# Patient Record
Sex: FEMALE | Race: WHITE | Employment: UNEMPLOYED | URBAN - METROPOLITAN AREA
[De-identification: names, ages, dates, MRNs, and addresses within clinical notes are randomized per-mention and may not be internally consistent; named-entity substitution may affect disease eponyms.]

---

## 2017-01-23 RX ORDER — CETIRIZINE HYDROCHLORIDE 10 MG/1
TABLET ORAL
Qty: 30 TABLET | Refills: 3 | Status: SHIPPED | OUTPATIENT
Start: 2017-01-23 | End: 2017-05-15 | Stop reason: SDUPTHER

## 2017-02-06 ENCOUNTER — PATIENT MESSAGE (OUTPATIENT)
Dept: INTERNAL MEDICINE | Age: 52
End: 2017-02-06

## 2017-02-20 ENCOUNTER — OFFICE VISIT (OUTPATIENT)
Dept: INTERNAL MEDICINE | Age: 52
End: 2017-02-20

## 2017-02-20 VITALS
OXYGEN SATURATION: 98 % | BODY MASS INDEX: 50.02 KG/M2 | SYSTOLIC BLOOD PRESSURE: 110 MMHG | HEART RATE: 65 BPM | RESPIRATION RATE: 16 BRPM | TEMPERATURE: 98.7 F | DIASTOLIC BLOOD PRESSURE: 80 MMHG | WEIGHT: 293 LBS | HEIGHT: 64 IN

## 2017-02-20 DIAGNOSIS — D50.9 IRON DEFICIENCY ANEMIA, UNSPECIFIED IRON DEFICIENCY ANEMIA TYPE: Primary | ICD-10-CM

## 2017-02-20 DIAGNOSIS — G43.709 CHRONIC MIGRAINE WITHOUT AURA WITHOUT STATUS MIGRAINOSUS, NOT INTRACTABLE: ICD-10-CM

## 2017-02-20 DIAGNOSIS — Z79.899 ENCOUNTER FOR LONG-TERM CURRENT USE OF MEDICATION: ICD-10-CM

## 2017-02-20 DIAGNOSIS — Z12.31 SCREENING MAMMOGRAM, ENCOUNTER FOR: ICD-10-CM

## 2017-02-20 DIAGNOSIS — E66.01 MORBID OBESITY WITH BMI OF 45.0-49.9, ADULT (HCC): ICD-10-CM

## 2017-02-20 DIAGNOSIS — R30.0 DYSURIA: ICD-10-CM

## 2017-02-20 LAB
AMPHETAMINE SCREEN, URINE: ABNORMAL
BARBITURATE SCREEN URINE: ABNORMAL
BENZODIAZEPINE SCREEN, URINE: ABNORMAL
BILIRUBIN, POC: NORMAL
BLOOD URINE, POC: NORMAL
CANNABINOID SCREEN URINE: ABNORMAL
CLARITY, POC: CLEAR
COCAINE METABOLITE SCREEN URINE: ABNORMAL
COLOR, POC: NORMAL
GLUCOSE URINE, POC: NORMAL
KETONES, POC: NORMAL
LEUKOCYTE EST, POC: NORMAL
Lab: ABNORMAL
NITRITE, POC: NORMAL
OPIATE SCREEN URINE: POSITIVE
PH, POC: 6
PHENCYCLIDINE SCREEN URINE: ABNORMAL
PROTEIN, POC: NORMAL
SPECIFIC GRAVITY, POC: 1.01
UROBILINOGEN, POC: NORMAL

## 2017-02-20 PROCEDURE — 99214 OFFICE O/P EST MOD 30 MIN: CPT | Performed by: FAMILY MEDICINE

## 2017-02-20 PROCEDURE — 81002 URINALYSIS NONAUTO W/O SCOPE: CPT | Performed by: FAMILY MEDICINE

## 2017-02-20 RX ORDER — SUMATRIPTAN 50 MG/1
TABLET, FILM COATED ORAL
Qty: 9 TABLET | Refills: 3 | Status: SHIPPED | OUTPATIENT
Start: 2017-02-20 | End: 2017-08-17 | Stop reason: SDUPTHER

## 2017-02-20 RX ORDER — ACETAMINOPHEN AND CODEINE PHOSPHATE 300; 30 MG/1; MG/1
1-2 TABLET ORAL EVERY 6 HOURS PRN
Qty: 45 TABLET | Refills: 1 | Status: SHIPPED | OUTPATIENT
Start: 2017-02-20 | End: 2017-04-19 | Stop reason: SDUPTHER

## 2017-02-21 ENCOUNTER — PATIENT MESSAGE (OUTPATIENT)
Dept: INTERNAL MEDICINE | Age: 52
End: 2017-02-21

## 2017-03-20 ENCOUNTER — HOSPITAL ENCOUNTER (OUTPATIENT)
Dept: WOMENS IMAGING | Age: 52
Discharge: HOME | End: 2017-03-20
Payer: COMMERCIAL

## 2017-03-20 DIAGNOSIS — Z12.31 SCREENING MAMMOGRAM, ENCOUNTER FOR: ICD-10-CM

## 2017-03-20 PROCEDURE — 77063 BREAST TOMOSYNTHESIS BI: CPT

## 2017-03-22 ENCOUNTER — TELEPHONE (OUTPATIENT)
Dept: INTERNAL MEDICINE | Age: 52
End: 2017-03-22

## 2017-03-22 DIAGNOSIS — R92.8 ABNORMAL MAMMOGRAM: Primary | ICD-10-CM

## 2017-04-04 ENCOUNTER — NURSE ONLY (OUTPATIENT)
Dept: INTERNAL MEDICINE | Age: 52
End: 2017-04-04

## 2017-04-04 DIAGNOSIS — D50.9 IRON DEFICIENCY ANEMIA, UNSPECIFIED IRON DEFICIENCY ANEMIA TYPE: Primary | ICD-10-CM

## 2017-04-04 LAB
BASOPHILS ABSOLUTE: 0.1 K/UL (ref 0–0.2)
BASOPHILS RELATIVE PERCENT: 1.2 %
EOSINOPHILS ABSOLUTE: 0.1 K/UL (ref 0–0.7)
EOSINOPHILS RELATIVE PERCENT: 2.4 %
HCT VFR BLD CALC: 41.5 % (ref 37–47)
HEMOGLOBIN: 13.8 G/DL (ref 12–16)
LYMPHOCYTES ABSOLUTE: 1 K/UL (ref 1–4.8)
LYMPHOCYTES RELATIVE PERCENT: 21.8 %
MCH RBC QN AUTO: 32.3 PG (ref 27–31.3)
MCHC RBC AUTO-ENTMCNC: 33.3 % (ref 33–37)
MCV RBC AUTO: 97.1 FL (ref 82–100)
MONOCYTES ABSOLUTE: 0.4 K/UL (ref 0.2–0.8)
MONOCYTES RELATIVE PERCENT: 7.6 %
NEUTROPHILS ABSOLUTE: 3.2 K/UL (ref 1.4–6.5)
NEUTROPHILS RELATIVE PERCENT: 67 %
PDW BLD-RTO: 13.9 % (ref 11.5–14.5)
PLATELET # BLD: 249 K/UL (ref 130–400)
RBC # BLD: 4.28 M/UL (ref 4.2–5.4)
WBC # BLD: 4.8 K/UL (ref 4.8–10.8)

## 2017-04-05 RX ORDER — FERROUS SULFATE 325(65) MG
TABLET ORAL
Qty: 90 TABLET | Refills: 1
Start: 2017-04-05

## 2017-04-17 ENCOUNTER — HOSPITAL ENCOUNTER (OUTPATIENT)
Dept: WOMENS IMAGING | Age: 52
Discharge: HOME | End: 2017-04-17
Payer: COMMERCIAL

## 2017-04-17 ENCOUNTER — HOSPITAL ENCOUNTER (OUTPATIENT)
Dept: ULTRASOUND IMAGING | Age: 52
Discharge: HOME | End: 2017-04-17
Payer: COMMERCIAL

## 2017-04-17 DIAGNOSIS — R92.8 ABNORMAL MAMMOGRAM OF LEFT BREAST: ICD-10-CM

## 2017-04-17 DIAGNOSIS — R92.8 ABNORMAL MAMMOGRAM: ICD-10-CM

## 2017-04-17 PROCEDURE — G0279 TOMOSYNTHESIS, MAMMO: HCPCS

## 2017-04-17 PROCEDURE — 76642 ULTRASOUND BREAST LIMITED: CPT

## 2017-04-19 RX ORDER — ACETAMINOPHEN AND CODEINE PHOSPHATE 300; 30 MG/1; MG/1
1-2 TABLET ORAL EVERY 6 HOURS PRN
Qty: 45 TABLET | Refills: 1 | Status: SHIPPED | OUTPATIENT
Start: 2017-04-19 | End: 2017-05-15 | Stop reason: SDUPTHER

## 2017-04-19 RX ORDER — MELOXICAM 15 MG/1
TABLET ORAL
Qty: 30 TABLET | Refills: 3 | Status: SHIPPED | OUTPATIENT
Start: 2017-04-19 | End: 2017-08-26 | Stop reason: SDUPTHER

## 2017-05-09 RX ORDER — ACYCLOVIR 400 MG/1
TABLET ORAL
Qty: 30 TABLET | Refills: 2 | Status: SHIPPED | OUTPATIENT
Start: 2017-05-09 | End: 2019-04-16 | Stop reason: ALTCHOICE

## 2017-05-15 ENCOUNTER — OFFICE VISIT (OUTPATIENT)
Dept: INTERNAL MEDICINE | Age: 52
End: 2017-05-15

## 2017-05-15 VITALS
HEIGHT: 64 IN | DIASTOLIC BLOOD PRESSURE: 70 MMHG | HEART RATE: 80 BPM | BODY MASS INDEX: 50.02 KG/M2 | TEMPERATURE: 98 F | WEIGHT: 293 LBS | OXYGEN SATURATION: 98 % | SYSTOLIC BLOOD PRESSURE: 118 MMHG | RESPIRATION RATE: 16 BRPM

## 2017-05-15 DIAGNOSIS — Z01.419 ENCOUNTER FOR GYNECOLOGICAL EXAMINATION: Primary | ICD-10-CM

## 2017-05-15 PROCEDURE — 99396 PREV VISIT EST AGE 40-64: CPT | Performed by: FAMILY MEDICINE

## 2017-05-15 RX ORDER — CETIRIZINE HYDROCHLORIDE 10 MG/1
TABLET ORAL
Qty: 30 TABLET | Refills: 5 | Status: SHIPPED | OUTPATIENT
Start: 2017-05-15 | End: 2017-11-27 | Stop reason: SDUPTHER

## 2017-05-15 RX ORDER — ACETAMINOPHEN AND CODEINE PHOSPHATE 300; 30 MG/1; MG/1
1-2 TABLET ORAL EVERY 6 HOURS PRN
Qty: 45 TABLET | Refills: 1 | Status: SHIPPED | OUTPATIENT
Start: 2017-05-15 | End: 2017-06-21 | Stop reason: SDUPTHER

## 2017-05-19 DIAGNOSIS — Z01.419 ENCOUNTER FOR GYNECOLOGICAL EXAMINATION: ICD-10-CM

## 2017-06-21 RX ORDER — ACETAMINOPHEN AND CODEINE PHOSPHATE 300; 30 MG/1; MG/1
1 TABLET ORAL EVERY 6 HOURS PRN
Qty: 45 TABLET | Refills: 1 | Status: SHIPPED | OUTPATIENT
Start: 2017-06-21 | End: 2017-08-14 | Stop reason: SDUPTHER

## 2017-08-02 ENCOUNTER — HOSPITAL ENCOUNTER (OUTPATIENT)
Dept: GENERAL RADIOLOGY | Age: 52
Discharge: HOME | End: 2017-08-02
Payer: COMMERCIAL

## 2017-08-02 DIAGNOSIS — M47.816 SPONDYLOSIS OF LUMBAR REGION WITHOUT MYELOPATHY OR RADICULOPATHY: ICD-10-CM

## 2017-08-02 PROCEDURE — 72110 X-RAY EXAM L-2 SPINE 4/>VWS: CPT

## 2017-08-14 ENCOUNTER — INITIAL CONSULT (OUTPATIENT)
Dept: PODIATRY | Facility: CLINIC | Age: 52
End: 2017-08-14

## 2017-08-14 VITALS
HEIGHT: 64 IN | WEIGHT: 293 LBS | DIASTOLIC BLOOD PRESSURE: 82 MMHG | TEMPERATURE: 98.2 F | SYSTOLIC BLOOD PRESSURE: 132 MMHG | BODY MASS INDEX: 50.02 KG/M2

## 2017-08-14 DIAGNOSIS — M72.2 PLANTAR FASCIITIS, BILATERAL: ICD-10-CM

## 2017-08-14 DIAGNOSIS — M79.671 PAIN IN BOTH FEET: Primary | ICD-10-CM

## 2017-08-14 DIAGNOSIS — M79.672 PAIN IN BOTH FEET: Primary | ICD-10-CM

## 2017-08-14 DIAGNOSIS — M65.872 OTHER SYNOVITIS AND TENOSYNOVITIS, LEFT ANKLE AND FOOT: ICD-10-CM

## 2017-08-14 DIAGNOSIS — M65.871 OTHER SYNOVITIS AND TENOSYNOVITIS, RIGHT ANKLE AND FOOT: ICD-10-CM

## 2017-08-14 DIAGNOSIS — M20.5X1 HALLUX LIMITUS OF RIGHT FOOT: ICD-10-CM

## 2017-08-14 DIAGNOSIS — M20.5X2 HALLUX LIMITUS OF LEFT FOOT: ICD-10-CM

## 2017-08-14 DIAGNOSIS — M19.072 PRIMARY OSTEOARTHRITIS OF BOTH FEET: ICD-10-CM

## 2017-08-14 DIAGNOSIS — M19.071 PRIMARY OSTEOARTHRITIS OF BOTH FEET: ICD-10-CM

## 2017-08-14 RX ORDER — ACETAMINOPHEN AND CODEINE PHOSPHATE 300; 30 MG/1; MG/1
1 TABLET ORAL EVERY 6 HOURS PRN
Qty: 45 TABLET | Refills: 1 | Status: SHIPPED | OUTPATIENT
Start: 2017-08-14 | End: 2017-08-14 | Stop reason: CLARIF

## 2017-08-14 ASSESSMENT — ENCOUNTER SYMPTOMS
DIARRHEA: 0
NAUSEA: 0
SHORTNESS OF BREATH: 0
CONSTIPATION: 0
BACK PAIN: 1

## 2017-08-15 ENCOUNTER — TELEPHONE (OUTPATIENT)
Dept: PODIATRY | Facility: CLINIC | Age: 52
End: 2017-08-15

## 2017-08-17 RX ORDER — SUMATRIPTAN 50 MG/1
TABLET, FILM COATED ORAL
Qty: 9 TABLET | Refills: 3 | Status: SHIPPED | OUTPATIENT
Start: 2017-08-17 | End: 2018-02-17 | Stop reason: SDUPTHER

## 2017-08-28 ENCOUNTER — OFFICE VISIT (OUTPATIENT)
Dept: PODIATRY | Facility: CLINIC | Age: 52
End: 2017-08-28

## 2017-08-28 VITALS — TEMPERATURE: 98.8 F | HEIGHT: 64 IN | WEIGHT: 293 LBS | BODY MASS INDEX: 50.02 KG/M2

## 2017-08-28 DIAGNOSIS — M65.872 OTHER SYNOVITIS AND TENOSYNOVITIS, LEFT ANKLE AND FOOT: ICD-10-CM

## 2017-08-28 DIAGNOSIS — M72.2 PLANTAR FASCIITIS: ICD-10-CM

## 2017-08-28 DIAGNOSIS — M19.071 PRIMARY OSTEOARTHRITIS OF BOTH FEET: ICD-10-CM

## 2017-08-28 DIAGNOSIS — M65.871 OTHER SYNOVITIS AND TENOSYNOVITIS, RIGHT ANKLE AND FOOT: ICD-10-CM

## 2017-08-28 DIAGNOSIS — M79.671 PAIN IN BOTH FEET: Primary | ICD-10-CM

## 2017-08-28 DIAGNOSIS — M79.672 PAIN IN BOTH FEET: Primary | ICD-10-CM

## 2017-08-28 DIAGNOSIS — M20.5X1 HALLUX LIMITUS, RIGHT: ICD-10-CM

## 2017-08-28 DIAGNOSIS — M19.072 PRIMARY OSTEOARTHRITIS OF BOTH FEET: ICD-10-CM

## 2017-08-28 DIAGNOSIS — M76.821 POSTERIOR TIBIAL TENDONITIS, RIGHT: ICD-10-CM

## 2017-08-28 DIAGNOSIS — M20.5X2 HALLUX LIMITUS OF LEFT FOOT: ICD-10-CM

## 2017-08-28 RX ORDER — MELOXICAM 15 MG/1
TABLET ORAL
Qty: 30 TABLET | Refills: 3 | Status: SHIPPED | OUTPATIENT
Start: 2017-08-28 | End: 2017-12-27 | Stop reason: SDUPTHER

## 2017-08-28 ASSESSMENT — ENCOUNTER SYMPTOMS
BACK PAIN: 1
NAUSEA: 0
CONSTIPATION: 0
DIARRHEA: 0
SHORTNESS OF BREATH: 0

## 2017-09-24 ENCOUNTER — HOSPITAL ENCOUNTER (OUTPATIENT)
Dept: MRI IMAGING | Age: 52
Discharge: HOME | End: 2017-09-24
Payer: COMMERCIAL

## 2017-09-24 DIAGNOSIS — M47.816 SPONDYLOSIS OF LUMBAR REGION WITHOUT MYELOPATHY OR RADICULOPATHY: ICD-10-CM

## 2017-09-24 PROCEDURE — 72148 MRI LUMBAR SPINE W/O DYE: CPT

## 2017-10-13 ENCOUNTER — OFFICE VISIT (OUTPATIENT)
Dept: PODIATRY | Facility: CLINIC | Age: 52
End: 2017-10-13

## 2017-10-13 VITALS — WEIGHT: 293 LBS | HEIGHT: 64 IN | BODY MASS INDEX: 50.02 KG/M2

## 2017-10-13 DIAGNOSIS — M65.871 OTHER SYNOVITIS AND TENOSYNOVITIS, RIGHT ANKLE AND FOOT: ICD-10-CM

## 2017-10-13 DIAGNOSIS — M79.671 PAIN IN BOTH FEET: Primary | ICD-10-CM

## 2017-10-13 DIAGNOSIS — M72.2 PLANTAR FASCIITIS, BILATERAL: ICD-10-CM

## 2017-10-13 DIAGNOSIS — M79.672 PAIN IN BOTH FEET: Primary | ICD-10-CM

## 2017-10-13 DIAGNOSIS — M76.821 POSTERIOR TIBIAL TENDINITIS OF RIGHT LEG: ICD-10-CM

## 2017-10-13 ASSESSMENT — ENCOUNTER SYMPTOMS
SHORTNESS OF BREATH: 0
BACK PAIN: 1
NAUSEA: 0
CONSTIPATION: 0
DIARRHEA: 0

## 2017-11-10 NOTE — PROGRESS NOTES
<><><> Patient is Requesting this Medication <><><>    <><><> THIS MEDICATION IS NOT ON THE PATIENTS CURRENT MED LIST! PLEASE ADD - AND SEND A NEW RX <><><>        Patients mail order pharmacy is closing and she needs new Rx's for her lancets and test strips. She has a Prodigy AutoCode talking meter and needs Prodigy AutoCode Test Strips NDC [id number] and Prodigy Twist Top Lancets 28G NDC [id number].    This is a Medicare patient so please send Medicare compliant Rx's.    Thanks,  April Madison [facility] Float Tech.  [facility]   Grecia Vernon  (1965)    10/13/17    Subjective:     Grecia Vernon is 54 y.o. female who complains today of:    Chief Complaint   Patient presents with    Follow-up     6 WEEK    Foot Pain     Bilateral       HPI:  Patient presents for follow-up care for right foot pain. The patient states that she is not having pain across the entire front of the ankle joint she walks. This pain is not present when she is at rest.  There is an ache while at rest, the pain is sharp when walking. The patient states that she has been wearing the inserts as instructed. She has been performing the exercises and icing as instructed. Review of Systems   Constitutional: Negative for fever. HENT: Negative for hearing loss. Respiratory: Negative for shortness of breath. Gastrointestinal: Negative for constipation, diarrhea and nausea. Genitourinary: Negative for difficulty urinating. Musculoskeletal: Positive for back pain. Negative for neck pain. Skin: Negative for rash. Neurological: Negative for headaches. Hematological: Does not bruise/bleed easily. Psychiatric/Behavioral: Negative for sleep disturbance. She has not tried physical therapy. Date of last of last PT treatment: none    Allergies:  Seasonal    Current Outpatient Prescriptions on File Prior to Visit   Medication Sig Dispense Refill    acetaminophen-codeine (TYLENOL #3) 300-30 MG per tablet Take 1 tablet by mouth daily as needed for Pain 30 tablet 0    meloxicam (MOBIC) 15 MG tablet TAKE ONE TABLET BY MOUTH EVERY DAY AS NEEDED FOR PAIN 30 tablet 3    SUMAtriptan (IMITREX) 50 MG tablet TAKE 1 TABLET BY MOUTH ONCE AS NEEDED FOR MIGRAINE 9 tablet 3    cetirizine (ZYRTEC) 10 MG tablet TAKE ONE TABLET BY MOUTH EVERY DAY 30 tablet 5    acyclovir (ZOVIRAX) 400 MG tablet TAKE ONE TABLET BY MOUTH THREE TIMES A DAY FOR 3 DAYS FOR COLD SORES AS NEEDED 30 tablet 2    ferrous sulfate 325 (65 FE) MG tablet One PO three times a week. 90 tablet 1    Multiple Vitamins-Minerals (HAIR/SKIN/NAILS PO) Take 2 tablets by mouth daily      Cholecalciferol (VITAMIN D) 2000 UNITS CAPS capsule Take 1,000 Units by mouth daily       Ascorbic Acid (VITAMIN C) 500 MG CAPS Take 1 tablet by mouth daily.  Cyanocobalamin (B-12) 1000 MCG TBCR Take 1 tablet by mouth daily. No current facility-administered medications on file prior to visit. Past Medical History:   Diagnosis Date    B12 deficiency     Bursitis, hip     Bursitis, hip     Chronic back pain     Depression     DJD (degenerative joint disease) of knee     L spine, hips, and knees    H/O blood clots 3/7/13    in lungs-legs where clear    Headache     Herpes simplex     History of depression     Neuropathy (HCC)     Vit B12 deff and DM    Obesity     dispite gastric bypass    Vitamin D deficiency      Past Surgical History:   Procedure Laterality Date     SECTION      GASTRIC BYPASS SURGERY  2001    HERNIA REPAIR      JOINT REPLACEMENT Right 10/29/13    DR Anjali Shen    SMALL INTESTINE SURGERY      TOTAL KNEE ARTHROPLASTY Right 10/29/13     Social History     Social History    Marital status:      Spouse name: N/A    Number of children: N/A    Years of education: N/A     Occupational History    Not on file.      Social History Main Topics    Smoking status: Never Smoker    Smokeless tobacco: Never Used    Alcohol use No    Drug use: No    Sexual activity: No     Other Topics Concern    Not on file     Social History Narrative    No narrative on file     Family History   Problem Relation Age of Onset    Dementia Father     Heart Disease Father     Parkinsonism Father     Hypertension Mother     High Blood Pressure Mother     Thyroid Cancer Sister            Objective:   Vitals:  Ht 5' 4\" (1.626 m)   Wt 300 lb (136.1 kg)   LMP 2016   BMI 51.49 kg/m² Pain Score:   5    Physical Exam    Vascular:   Dorsalis pedis pulse is graded at 2/4 bilateral foot. Posterior tibial pulses graded at 1/4 bilateral foot. There is no edema noted to the bilateral lower extremity. Capillary refill is immediate bilateral hallux. There are no varicosities noted bilaterally. There are no telangiectasia noted bilaterally. Skin temperature gradient is noted to be warm to warm bilaterally. There are no signs of ischemia or skin atrophy noted bilaterally. Hair growth is present bilaterally.     Neurological:   Protective sensation is intact bilaterally. Vibratory sensation is intact bilaterally. Sharp/dull sensation is intact bilaterally. Patellar deep tendon reflex not tested due to TKA bilateral.  Achilles tendon deep tendon reflex is graded at 0/4 bilaterally. The Babinski response is negative bilaterally. No ankle clonus is noted bilaterally. Pain on palpation of the left common peroneal nerve, no radiating pain elicited.  Positive Tinel sign with percussion of the deep peroneal nerve bilateral foot.     Dermatological:  No open lesions noted bilateral foot. Focal, hyperkeratotic lesions noted to the medial hallux bilaterally, the right first MPJ and distal tip of the right third toe. Mild erythema overlying the fourth and fifth PIPJ and third PIPJ bilateral foot. Nails are within normal limits and are well groomed. Normal texture and turgor noted bilaterally. Webspaces are intact bilateral foot.     Musculoskeletal:  Rectus foot type noted nonweightbearing bilaterally. Planus foot type noted with weightbearing. Manual muscle strength is graded at 5/5 for all groups tested bilateral foot. Gross static deformities noted: Tailor's bunion and adductovarus fifth digit bilateral foot.  Semirigid hammertoe deformities noted to toes 2 through 4 bilateral foot. .  Pain and crepitus noted with range of motion of the subtalar joint bilateral foot.  Pain on palpation of the sinus tarsi right foot.  Functional hallux limitus noted bilateral foot.  Pain on

## 2017-11-27 ENCOUNTER — NURSE ONLY (OUTPATIENT)
Dept: INTERNAL MEDICINE | Age: 52
End: 2017-11-27

## 2017-11-27 DIAGNOSIS — Z23 NEED FOR INFLUENZA VACCINATION: Primary | ICD-10-CM

## 2017-11-27 PROCEDURE — 90471 IMMUNIZATION ADMIN: CPT | Performed by: FAMILY MEDICINE

## 2017-11-27 PROCEDURE — 90688 IIV4 VACCINE SPLT 0.5 ML IM: CPT | Performed by: FAMILY MEDICINE

## 2017-11-27 RX ORDER — CETIRIZINE HYDROCHLORIDE 10 MG/1
TABLET ORAL
Qty: 30 TABLET | Refills: 4 | Status: SHIPPED | OUTPATIENT
Start: 2017-11-27 | End: 2018-04-26 | Stop reason: SDUPTHER

## 2017-12-28 PROBLEM — M46.1 SACROILIITIS, NOT ELSEWHERE CLASSIFIED (HCC): Status: ACTIVE | Noted: 2017-12-28

## 2017-12-28 RX ORDER — MELOXICAM 15 MG/1
TABLET ORAL
Qty: 30 TABLET | Refills: 2 | Status: SHIPPED | OUTPATIENT
Start: 2017-12-28 | End: 2018-03-14

## 2018-02-19 RX ORDER — SUMATRIPTAN 50 MG/1
TABLET, FILM COATED ORAL
Qty: 9 TABLET | Refills: 2 | Status: SHIPPED | OUTPATIENT
Start: 2018-02-19 | End: 2018-05-28 | Stop reason: SDUPTHER

## 2018-03-27 ENCOUNTER — OFFICE VISIT (OUTPATIENT)
Dept: INTERNAL MEDICINE CLINIC | Age: 53
End: 2018-03-27
Payer: COMMERCIAL

## 2018-03-27 VITALS
TEMPERATURE: 98.6 F | SYSTOLIC BLOOD PRESSURE: 110 MMHG | HEIGHT: 64 IN | DIASTOLIC BLOOD PRESSURE: 78 MMHG | OXYGEN SATURATION: 97 % | HEART RATE: 75 BPM | RESPIRATION RATE: 16 BRPM | BODY MASS INDEX: 50.02 KG/M2 | WEIGHT: 293 LBS

## 2018-03-27 DIAGNOSIS — J06.9 UPPER RESPIRATORY TRACT INFECTION, UNSPECIFIED TYPE: ICD-10-CM

## 2018-03-27 DIAGNOSIS — E55.9 VITAMIN D DEFICIENCY: ICD-10-CM

## 2018-03-27 DIAGNOSIS — Z98.84 S/P BARIATRIC SURGERY: Primary | ICD-10-CM

## 2018-03-27 DIAGNOSIS — Z13.220 LIPID SCREENING: ICD-10-CM

## 2018-03-27 DIAGNOSIS — E53.8 B12 DEFICIENCY: ICD-10-CM

## 2018-03-27 DIAGNOSIS — M81.8 OTHER OSTEOPOROSIS WITHOUT CURRENT PATHOLOGICAL FRACTURE: ICD-10-CM

## 2018-03-27 DIAGNOSIS — Z12.11 SCREENING FOR COLON CANCER: ICD-10-CM

## 2018-03-27 DIAGNOSIS — Z12.31 SCREENING MAMMOGRAM, ENCOUNTER FOR: ICD-10-CM

## 2018-03-27 DIAGNOSIS — R53.83 FATIGUE, UNSPECIFIED TYPE: ICD-10-CM

## 2018-03-27 PROBLEM — M46.1 SACROILIITIS, NOT ELSEWHERE CLASSIFIED (HCC): Status: RESOLVED | Noted: 2017-12-28 | Resolved: 2018-03-27

## 2018-03-27 PROCEDURE — 99214 OFFICE O/P EST MOD 30 MIN: CPT | Performed by: FAMILY MEDICINE

## 2018-03-27 RX ORDER — MELOXICAM 15 MG/1
15 TABLET ORAL
COMMUNITY
End: 2018-04-23

## 2018-03-27 RX ORDER — ZOLEDRONIC ACID 5 MG/100ML
5 INJECTION, SOLUTION INTRAVENOUS ONCE
Qty: 100 ML | Refills: 0 | Status: SHIPPED | OUTPATIENT
Start: 2018-03-27 | End: 2018-03-29 | Stop reason: SDUPTHER

## 2018-03-27 RX ORDER — AZITHROMYCIN 250 MG/1
TABLET, FILM COATED ORAL
Qty: 1 PACKET | Refills: 0 | Status: SHIPPED | OUTPATIENT
Start: 2018-03-27 | End: 2018-04-06

## 2018-03-27 ASSESSMENT — PATIENT HEALTH QUESTIONNAIRE - PHQ9
SUM OF ALL RESPONSES TO PHQ QUESTIONS 1-9: 0
1. LITTLE INTEREST OR PLEASURE IN DOING THINGS: 0
SUM OF ALL RESPONSES TO PHQ9 QUESTIONS 1 & 2: 0
2. FEELING DOWN, DEPRESSED OR HOPELESS: 0

## 2018-03-27 NOTE — PROGRESS NOTES
for cough, negative for dyspnea and wheezing. Cardiovascular:  Negative for chest pain, claudication and irregular heartbeat/palpitations. Gastrointestinal: Negative for abdominal pain, nausea, constipation and diarrhea. Genitourinary: Negative for dysuria,patient post menopausal.  Metabolic/Endocrine: Negative for cold intolerance, heat intolerance, polydipsia and polyphagia. No unintended weight loss or weight gain. Neuro/Psychiatric: Negative for gait disturbance. Negative for psychiatric symptoms. Dermatologic: Negative for pruritus and rash. Musculoskeletal: positive for bone/joint symptoms. No numbness or tingling. No loss of function. Hematology: Negative for bleeding and easy bruising. Immunology:  Negative for environmental allergies and food allergies. Physical Exam    Patient's medication, allergies, past medical, surgical, social and family histories were reviewed and updated as appropriate. PHYSICAL EXAM   General Appearance:  Alert oriented pleasant cooperative in no acute distress. HEENT: Eyes clear nonicteric facial muscles symmetrical.  No pain to palpation of sinuses. Ears TMs intact no erythema canals normal, oropharynx slightly hyperemic no exudates indurations or ulceration  Neck: Soft nontender but she does have certain cervical adenopathy more prominent on the left than the right slightly tender. No masses no carotid bruits  Lungs: Clear to auscultation no wheezes rhonchi or rales  Heart: Regular rate and rhythm without murmurs rubs or gallops  Extremities: No rashes or edema neurologically intact. Assessment:  1. S/P bariatric surgery     2. Upper respiratory tract infection, unspecified type  azithromycin (ZITHROMAX Z-ANNE-MARIE) 250 MG tablet   3. Screening for colon cancer  [address] [facility] [address] [facility]   4. Screening mammogram, encounter for  EBONY DIGITAL SCREEN W CAD BILATERAL   5. Lipid screening  Lipid Panel   6.  B12 deficiency  Vitamin B12 & Folate   7. BMI 50.0-59.9, adult ([address]     8. Vitamin D deficiency  Vitamin D 25 Hydroxy   9. Fatigue, unspecified type  CBC Auto Differential    TSH with Reflex   10. Other osteoporosis without current pathological fracture  zoledronic acid (RECLAST) 5 MG/100ML SOLN               Plan:  Current Outpatient Prescriptions   Medication Sig Dispense Refill    meloxicam (MOBIC) 15 MG tablet Take 15 mg by mouth      azithromycin (ZITHROMAX Z-ANNE-MARIE) 250 MG tablet Complete entire pack as directed. 1 packet 0    zoledronic acid (RECLAST) 5 MG/100ML SOLN Infuse 100 mLs intravenously once for 1 dose M81.0 100 mL 0    acetaminophen-codeine (TYLENOL #3) 300-30 MG per tablet Take 1 tablet by mouth daily as needed for Pain for up to 30 days. 30 tablet 0    SUMAtriptan (IMITREX) 50 MG tablet TAKE 1 TABLET BY MOUTH ONCE AS NEEDED FOR MIGRAINE 9 tablet 2    cetirizine (ZYRTEC) 10 MG tablet TAKE ONE TABLET BY MOUTH EVERY DAY 30 tablet 4    acyclovir (ZOVIRAX) 400 MG tablet TAKE ONE TABLET BY MOUTH THREE TIMES A DAY FOR 3 DAYS FOR COLD SORES AS NEEDED 30 tablet 2    ferrous sulfate 325 (65 FE) MG tablet One PO three times a week. 90 tablet 1    Multiple Vitamins-Minerals (HAIR/SKIN/NAILS PO) Take 2 tablets by mouth daily      Cholecalciferol (VITAMIN D) 2000 UNITS CAPS capsule Take 1,000 Units by mouth daily       Ascorbic Acid (VITAMIN C) 500 MG CAPS Take 1 tablet by mouth daily.  Cyanocobalamin (B-12) 1000 MCG TBCR Take 1 tablet by mouth daily. No current facility-administered medications for this visit. Orders Placed This Encounter   Procedures    EBONY DIGITAL SCREEN W CAD BILATERAL     Standing Status:   Future     Standing Expiration Date:   3/27/2019     Scheduling Instructions:      US if needed.      Order Specific Question:   Reason for exam:     Answer:   routine    Lipid Panel     Standing Status:   Future     Standing Expiration Date:   3/27/2019     Order Specific Question:   Is Patient Fasting?/# of Hours     Answer:   No    CBC Auto Differential     Standing Status:   Future     Standing Expiration Date:   3/27/2019    Vitamin B12 & Folate     Standing Status:   Future     Standing Expiration Date:   3/27/2019    Vitamin D 25 Hydroxy     Standing Status:   Future     Standing Expiration Date:   3/27/2019    TSH with Reflex     Standing Status:   Future     Standing Expiration Date:   3/27/2019   [id number] [facility], [address] [facility]     Referral Priority:   Routine     Referral Type:   Consult for Advice and Opinion     Referral Reason:   Specialty Services Required     Referred to Provider:   Gisselle Bernal MD     Requested Specialty:   Gastroenterology     Number of Visits Requested:   1       Orders Placed This Encounter   Medications    azithromycin (ZITHROMAX Z-ANNE-MARIE) 250 MG tablet     Sig: Complete entire pack as directed. Dispense:  1 packet     Refill:  0    zoledronic acid (RECLAST) 5 MG/100ML SOLN     Sig: Infuse 100 mLs intravenously once for 1 dose M81.0     Dispense:  100 mL     Refill:  0             Return in about 1 year (around 3/27/2019).     Dr. Santiago Yu      3/27/18  11:39 AM

## 2018-03-29 ENCOUNTER — TELEPHONE (OUTPATIENT)
Dept: INTERNAL MEDICINE CLINIC | Age: 53
End: 2018-03-29

## 2018-03-29 DIAGNOSIS — M81.8 OTHER OSTEOPOROSIS WITHOUT CURRENT PATHOLOGICAL FRACTURE: ICD-10-CM

## 2018-03-29 RX ORDER — ZOLEDRONIC ACID 5 MG/100ML
5 INJECTION, SOLUTION INTRAVENOUS ONCE
Qty: 100 ML | Refills: 0 | Status: SHIPPED | OUTPATIENT
Start: 2018-03-29 | End: 2019-06-20

## 2018-03-29 NOTE — TELEPHONE ENCOUNTER
RX for reclast went to patient mail away pharmacy. Please rewrite and it will need to be faxed to hospital. [facility].

## 2018-04-02 ENCOUNTER — NURSE ONLY (OUTPATIENT)
Dept: INTERNAL MEDICINE CLINIC | Age: 53
End: 2018-04-02
Payer: COMMERCIAL

## 2018-04-02 ENCOUNTER — OFFICE VISIT (OUTPATIENT)
Dept: PODIATRY | Facility: CLINIC | Age: 53
End: 2018-04-02

## 2018-04-02 VITALS — HEIGHT: 64 IN | BODY MASS INDEX: 50.02 KG/M2 | RESPIRATION RATE: 18 BRPM | WEIGHT: 293 LBS

## 2018-04-02 DIAGNOSIS — M65.872 OTHER SYNOVITIS AND TENOSYNOVITIS, LEFT ANKLE AND FOOT: ICD-10-CM

## 2018-04-02 DIAGNOSIS — M79.671 PAIN IN BOTH FEET: Primary | ICD-10-CM

## 2018-04-02 DIAGNOSIS — M79.672 PAIN IN BOTH FEET: Primary | ICD-10-CM

## 2018-04-02 DIAGNOSIS — M65.871 OTHER SYNOVITIS AND TENOSYNOVITIS, RIGHT ANKLE AND FOOT: ICD-10-CM

## 2018-04-02 DIAGNOSIS — E55.9 VITAMIN D DEFICIENCY: ICD-10-CM

## 2018-04-02 DIAGNOSIS — M72.2 BILATERAL PLANTAR FASCIITIS: ICD-10-CM

## 2018-04-02 DIAGNOSIS — R53.83 FATIGUE, UNSPECIFIED TYPE: ICD-10-CM

## 2018-04-02 DIAGNOSIS — E53.8 B12 DEFICIENCY: ICD-10-CM

## 2018-04-02 DIAGNOSIS — Z13.220 LIPID SCREENING: Primary | ICD-10-CM

## 2018-04-02 LAB
BASOPHILS ABSOLUTE: 0 K/UL (ref 0–0.2)
BASOPHILS RELATIVE PERCENT: 0.5 %
CHOLESTEROL, TOTAL: 166 MG/DL (ref 0–199)
EOSINOPHILS ABSOLUTE: 0.1 K/UL (ref 0–0.7)
EOSINOPHILS RELATIVE PERCENT: 2.4 %
FOLATE: 12.5 NG/ML (ref 7.3–26.1)
HCT VFR BLD CALC: 40.4 % (ref 37–47)
HDLC SERPL-MCNC: 51 MG/DL (ref 40–59)
HEMOGLOBIN: 13.5 G/DL (ref 12–16)
LDL CHOLESTEROL CALCULATED: 94 MG/DL (ref 0–129)
LYMPHOCYTES ABSOLUTE: 1.2 K/UL (ref 1–4.8)
LYMPHOCYTES RELATIVE PERCENT: 21.8 %
MCH RBC QN AUTO: 33.4 PG (ref 27–31.3)
MCHC RBC AUTO-ENTMCNC: 33.4 % (ref 33–37)
MCV RBC AUTO: 100 FL (ref 82–100)
MONOCYTES ABSOLUTE: 0.3 K/UL (ref 0.2–0.8)
MONOCYTES RELATIVE PERCENT: 4.7 %
NEUTROPHILS ABSOLUTE: 3.8 K/UL (ref 1.4–6.5)
NEUTROPHILS RELATIVE PERCENT: 70.6 %
PDW BLD-RTO: 12.9 % (ref 11.5–14.5)
PLATELET # BLD: 234 K/UL (ref 130–400)
RBC # BLD: 4.04 M/UL (ref 4.2–5.4)
TRIGL SERPL-MCNC: 106 MG/DL (ref 0–200)
TSH REFLEX: 1.4 UIU/ML (ref 0.27–4.2)
VITAMIN B-12: 808 PG/ML (ref 232–1245)
VITAMIN D 25-HYDROXY: 23.2 NG/ML (ref 30–100)
WBC # BLD: 5.4 K/UL (ref 4.8–10.8)

## 2018-04-02 PROCEDURE — 36415 COLL VENOUS BLD VENIPUNCTURE: CPT | Performed by: FAMILY MEDICINE

## 2018-04-02 ASSESSMENT — ENCOUNTER SYMPTOMS
BACK PAIN: 1
DIARRHEA: 0
CONSTIPATION: 0
NAUSEA: 0
SHORTNESS OF BREATH: 0

## 2018-04-05 ENCOUNTER — HOSPITAL ENCOUNTER (OUTPATIENT)
Dept: WOMENS IMAGING | Age: 53
Discharge: HOME | End: 2018-04-07
Payer: COMMERCIAL

## 2018-04-05 ENCOUNTER — HOSPITAL ENCOUNTER (OUTPATIENT)
Dept: INFUSION THERAPY | Age: 53
Setting detail: INFUSION SERIES
Discharge: HOME | End: 2018-04-05
Payer: COMMERCIAL

## 2018-04-05 VITALS
SYSTOLIC BLOOD PRESSURE: 128 MMHG | HEART RATE: 71 BPM | RESPIRATION RATE: 18 BRPM | DIASTOLIC BLOOD PRESSURE: 82 MMHG | TEMPERATURE: 97.7 F

## 2018-04-05 DIAGNOSIS — Z12.31 SCREENING MAMMOGRAM, ENCOUNTER FOR: ICD-10-CM

## 2018-04-05 PROCEDURE — 77067 SCR MAMMO BI INCL CAD: CPT

## 2018-04-05 PROCEDURE — 96375 TX/PRO/DX INJ NEW DRUG ADDON: CPT

## 2018-04-05 PROCEDURE — 6360000002 HC RX W HCPCS: Performed by: FAMILY MEDICINE

## 2018-04-05 PROCEDURE — 96365 THER/PROPH/DIAG IV INF INIT: CPT

## 2018-04-05 RX ORDER — ZOLEDRONIC ACID 5 MG/100ML
5 INJECTION, SOLUTION INTRAVENOUS ONCE
Status: COMPLETED | OUTPATIENT
Start: 2018-04-05 | End: 2018-04-05

## 2018-04-05 RX ADMIN — ZOLEDRONIC ACID 5 MG: 5 INJECTION INTRAVENOUS at 11:00

## 2018-04-05 NOTE — FLOWSHEET NOTE
Infusion complete. Tolerated well. Ambulated off of the unit. All equipment used in the care for this patient has been cleaned.

## 2018-04-23 ENCOUNTER — OFFICE VISIT (OUTPATIENT)
Dept: PODIATRY | Facility: CLINIC | Age: 53
End: 2018-04-23

## 2018-04-23 VITALS — WEIGHT: 293 LBS | BODY MASS INDEX: 50.02 KG/M2 | HEIGHT: 64 IN | TEMPERATURE: 98.4 F

## 2018-04-23 DIAGNOSIS — M65.871 OTHER SYNOVITIS AND TENOSYNOVITIS, RIGHT ANKLE AND FOOT: ICD-10-CM

## 2018-04-23 DIAGNOSIS — M65.872 OTHER SYNOVITIS AND TENOSYNOVITIS, LEFT ANKLE AND FOOT: ICD-10-CM

## 2018-04-23 DIAGNOSIS — M79.672 PAIN IN BOTH FEET: Primary | ICD-10-CM

## 2018-04-23 DIAGNOSIS — M79.671 PAIN IN BOTH FEET: Primary | ICD-10-CM

## 2018-04-23 DIAGNOSIS — M25.371 INSTABILITY OF ANKLE JOINT, RIGHT: ICD-10-CM

## 2018-04-23 DIAGNOSIS — M25.372 INSTABILITY OF LEFT ANKLE JOINT: ICD-10-CM

## 2018-04-23 DIAGNOSIS — M72.2 PLANTAR FASCIITIS: ICD-10-CM

## 2018-04-23 ASSESSMENT — ENCOUNTER SYMPTOMS
DIARRHEA: 0
NAUSEA: 0
CONSTIPATION: 0
SHORTNESS OF BREATH: 0
BACK PAIN: 1

## 2018-04-24 ENCOUNTER — OFFICE VISIT (OUTPATIENT)
Dept: GASTROENTEROLOGY | Age: 53
End: 2018-04-24

## 2018-04-24 VITALS
SYSTOLIC BLOOD PRESSURE: 130 MMHG | DIASTOLIC BLOOD PRESSURE: 94 MMHG | WEIGHT: 293 LBS | BODY MASS INDEX: 53.04 KG/M2 | HEART RATE: 66 BPM

## 2018-04-24 DIAGNOSIS — Z12.11 SPECIAL SCREENING FOR MALIGNANT NEOPLASMS, COLON: Primary | ICD-10-CM

## 2018-04-24 PROCEDURE — PREOPEXAM PRE-OP EXAM: Performed by: INTERNAL MEDICINE

## 2018-04-27 RX ORDER — CETIRIZINE HYDROCHLORIDE 10 MG/1
TABLET ORAL
Qty: 30 TABLET | Refills: 3 | Status: SHIPPED | OUTPATIENT
Start: 2018-04-27 | End: 2018-08-18 | Stop reason: SDUPTHER

## 2018-05-10 ENCOUNTER — ANESTHESIA EVENT (OUTPATIENT)
Dept: OPERATING ROOM | Age: 53
End: 2018-05-10
Payer: COMMERCIAL

## 2018-05-10 ENCOUNTER — ANESTHESIA (OUTPATIENT)
Dept: OPERATING ROOM | Age: 53
End: 2018-05-10
Payer: COMMERCIAL

## 2018-05-10 ENCOUNTER — HOSPITAL ENCOUNTER (OUTPATIENT)
Age: 53
Setting detail: OUTPATIENT SURGERY
Discharge: HOME | End: 2018-05-10
Attending: INTERNAL MEDICINE | Admitting: INTERNAL MEDICINE
Payer: COMMERCIAL

## 2018-05-10 VITALS
RESPIRATION RATE: 16 BRPM | BODY MASS INDEX: 50.02 KG/M2 | OXYGEN SATURATION: 96 % | DIASTOLIC BLOOD PRESSURE: 99 MMHG | TEMPERATURE: 98.2 F | SYSTOLIC BLOOD PRESSURE: 139 MMHG | HEART RATE: 80 BPM | WEIGHT: 293 LBS | HEIGHT: 64 IN

## 2018-05-10 VITALS
SYSTOLIC BLOOD PRESSURE: 111 MMHG | RESPIRATION RATE: 16 BRPM | OXYGEN SATURATION: 96 % | DIASTOLIC BLOOD PRESSURE: 72 MMHG

## 2018-05-10 PROCEDURE — 2580000003 HC RX 258: Performed by: INTERNAL MEDICINE

## 2018-05-10 PROCEDURE — 3700000000 HC ANESTHESIA ATTENDED CARE: Performed by: INTERNAL MEDICINE

## 2018-05-10 PROCEDURE — G0121 COLON CA SCRN NOT HI RSK IND: HCPCS | Performed by: INTERNAL MEDICINE

## 2018-05-10 PROCEDURE — 3700000001 HC ADD 15 MINUTES (ANESTHESIA): Performed by: INTERNAL MEDICINE

## 2018-05-10 PROCEDURE — 6360000002 HC RX W HCPCS: Performed by: NURSE ANESTHETIST, CERTIFIED REGISTERED

## 2018-05-10 PROCEDURE — 7100000010 HC PHASE II RECOVERY - FIRST 15 MIN: Performed by: INTERNAL MEDICINE

## 2018-05-10 PROCEDURE — 7100000011 HC PHASE II RECOVERY - ADDTL 15 MIN: Performed by: INTERNAL MEDICINE

## 2018-05-10 PROCEDURE — 2500000003 HC RX 250 WO HCPCS: Performed by: ANESTHESIOLOGY

## 2018-05-10 PROCEDURE — 2580000003 HC RX 258: Performed by: NURSE PRACTITIONER

## 2018-05-10 PROCEDURE — 2500000003 HC RX 250 WO HCPCS: Performed by: NURSE ANESTHETIST, CERTIFIED REGISTERED

## 2018-05-10 PROCEDURE — 3609027000 HC COLONOSCOPY: Performed by: INTERNAL MEDICINE

## 2018-05-10 PROCEDURE — 6360000002 HC RX W HCPCS: Performed by: ANESTHESIOLOGY

## 2018-05-10 RX ORDER — METOCLOPRAMIDE HYDROCHLORIDE 5 MG/ML
10 INJECTION INTRAMUSCULAR; INTRAVENOUS
Status: DISCONTINUED | OUTPATIENT
Start: 2018-05-10 | End: 2018-05-10 | Stop reason: HOSPADM

## 2018-05-10 RX ORDER — FENTANYL CITRATE 50 UG/ML
50 INJECTION, SOLUTION INTRAMUSCULAR; INTRAVENOUS EVERY 10 MIN PRN
Status: DISCONTINUED | OUTPATIENT
Start: 2018-05-10 | End: 2018-05-10 | Stop reason: HOSPADM

## 2018-05-10 RX ORDER — SODIUM CHLORIDE 0.9 % (FLUSH) 0.9 %
10 SYRINGE (ML) INJECTION PRN
Status: DISCONTINUED | OUTPATIENT
Start: 2018-05-10 | End: 2018-05-10 | Stop reason: HOSPADM

## 2018-05-10 RX ORDER — MAGNESIUM HYDROXIDE 1200 MG/15ML
LIQUID ORAL PRN
Status: DISCONTINUED | OUTPATIENT
Start: 2018-05-10 | End: 2018-05-10 | Stop reason: HOSPADM

## 2018-05-10 RX ORDER — HYDROCODONE BITARTRATE AND ACETAMINOPHEN 5; 325 MG/1; MG/1
1 TABLET ORAL PRN
Status: DISCONTINUED | OUTPATIENT
Start: 2018-05-10 | End: 2018-05-10 | Stop reason: HOSPADM

## 2018-05-10 RX ORDER — SODIUM CHLORIDE 0.9 % (FLUSH) 0.9 %
10 SYRINGE (ML) INJECTION EVERY 12 HOURS SCHEDULED
Status: DISCONTINUED | OUTPATIENT
Start: 2018-05-10 | End: 2018-05-10 | Stop reason: HOSPADM

## 2018-05-10 RX ORDER — GLYCOPYRROLATE 1 MG/5 ML
SYRINGE (ML) INTRAVENOUS PRN
Status: DISCONTINUED | OUTPATIENT
Start: 2018-05-10 | End: 2018-05-10 | Stop reason: SDUPTHER

## 2018-05-10 RX ORDER — PROPOFOL 10 MG/ML
INJECTION, EMULSION INTRAVENOUS PRN
Status: DISCONTINUED | OUTPATIENT
Start: 2018-05-10 | End: 2018-05-10 | Stop reason: SDUPTHER

## 2018-05-10 RX ORDER — ONDANSETRON 2 MG/ML
4 INJECTION INTRAMUSCULAR; INTRAVENOUS
Status: COMPLETED | OUTPATIENT
Start: 2018-05-10 | End: 2018-05-10

## 2018-05-10 RX ORDER — MEPERIDINE HYDROCHLORIDE 25 MG/ML
12.5 INJECTION INTRAMUSCULAR; INTRAVENOUS; SUBCUTANEOUS EVERY 5 MIN PRN
Status: DISCONTINUED | OUTPATIENT
Start: 2018-05-10 | End: 2018-05-10 | Stop reason: HOSPADM

## 2018-05-10 RX ORDER — LIDOCAINE HYDROCHLORIDE 10 MG/ML
1 INJECTION, SOLUTION EPIDURAL; INFILTRATION; INTRACAUDAL; PERINEURAL
Status: DISCONTINUED | OUTPATIENT
Start: 2018-05-10 | End: 2018-05-10 | Stop reason: HOSPADM

## 2018-05-10 RX ORDER — DIPHENHYDRAMINE HYDROCHLORIDE 50 MG/ML
12.5 INJECTION INTRAMUSCULAR; INTRAVENOUS
Status: DISCONTINUED | OUTPATIENT
Start: 2018-05-10 | End: 2018-05-10 | Stop reason: HOSPADM

## 2018-05-10 RX ORDER — SODIUM CHLORIDE, SODIUM LACTATE, POTASSIUM CHLORIDE, CALCIUM CHLORIDE 600; 310; 30; 20 MG/100ML; MG/100ML; MG/100ML; MG/100ML
INJECTION, SOLUTION INTRAVENOUS CONTINUOUS
Status: DISCONTINUED | OUTPATIENT
Start: 2018-05-10 | End: 2018-05-10 | Stop reason: HOSPADM

## 2018-05-10 RX ORDER — SODIUM CHLORIDE 9 MG/ML
INJECTION, SOLUTION INTRAVENOUS CONTINUOUS
Status: DISCONTINUED | OUTPATIENT
Start: 2018-05-10 | End: 2018-05-10 | Stop reason: HOSPADM

## 2018-05-10 RX ORDER — LIDOCAINE HYDROCHLORIDE 20 MG/ML
INJECTION, SOLUTION INFILTRATION; PERINEURAL PRN
Status: DISCONTINUED | OUTPATIENT
Start: 2018-05-10 | End: 2018-05-10 | Stop reason: SDUPTHER

## 2018-05-10 RX ORDER — LIDOCAINE HYDROCHLORIDE 10 MG/ML
1 INJECTION, SOLUTION EPIDURAL; INFILTRATION; INTRACAUDAL; PERINEURAL
Status: COMPLETED | OUTPATIENT
Start: 2018-05-10 | End: 2018-05-10

## 2018-05-10 RX ORDER — HYDROCODONE BITARTRATE AND ACETAMINOPHEN 5; 325 MG/1; MG/1
2 TABLET ORAL PRN
Status: DISCONTINUED | OUTPATIENT
Start: 2018-05-10 | End: 2018-05-10 | Stop reason: HOSPADM

## 2018-05-10 RX ADMIN — PROPOFOL 50 MG: 10 INJECTION, EMULSION INTRAVENOUS at 12:30

## 2018-05-10 RX ADMIN — PROPOFOL 50 MG: 10 INJECTION, EMULSION INTRAVENOUS at 12:37

## 2018-05-10 RX ADMIN — SODIUM CHLORIDE, POTASSIUM CHLORIDE, SODIUM LACTATE AND CALCIUM CHLORIDE: 600; 310; 30; 20 INJECTION, SOLUTION INTRAVENOUS at 12:12

## 2018-05-10 RX ADMIN — LIDOCAINE HYDROCHLORIDE 50 MG: 20 INJECTION, SOLUTION INFILTRATION; PERINEURAL at 12:28

## 2018-05-10 RX ADMIN — PROPOFOL 20 MG: 10 INJECTION, EMULSION INTRAVENOUS at 12:32

## 2018-05-10 RX ADMIN — LIDOCAINE HYDROCHLORIDE 0.1 ML: 10 INJECTION, SOLUTION EPIDURAL; INFILTRATION; INTRACAUDAL; PERINEURAL at 12:12

## 2018-05-10 RX ADMIN — Medication 0.4 MG: at 12:38

## 2018-05-10 RX ADMIN — PROPOFOL 50 MG: 10 INJECTION, EMULSION INTRAVENOUS at 12:28

## 2018-05-10 RX ADMIN — PROPOFOL 20 MG: 10 INJECTION, EMULSION INTRAVENOUS at 12:41

## 2018-05-10 RX ADMIN — PROPOFOL 30 MG: 10 INJECTION, EMULSION INTRAVENOUS at 12:39

## 2018-05-10 RX ADMIN — PROPOFOL 30 MG: 10 INJECTION, EMULSION INTRAVENOUS at 12:34

## 2018-05-10 RX ADMIN — PROPOFOL 50 MG: 10 INJECTION, EMULSION INTRAVENOUS at 12:29

## 2018-05-10 RX ADMIN — ONDANSETRON 4 MG: 2 INJECTION INTRAMUSCULAR; INTRAVENOUS at 12:13

## 2018-05-10 ASSESSMENT — PULMONARY FUNCTION TESTS
PIF_VALUE: 0
PIF_VALUE: 0
PIF_VALUE: 1
PIF_VALUE: 0

## 2018-05-29 RX ORDER — SUMATRIPTAN 50 MG/1
TABLET, FILM COATED ORAL
Qty: 9 TABLET | Refills: 5 | Status: SHIPPED | OUTPATIENT
Start: 2018-05-29 | End: 2019-04-16 | Stop reason: SDUPTHER

## 2018-07-23 ENCOUNTER — OFFICE VISIT (OUTPATIENT)
Dept: PODIATRY | Facility: CLINIC | Age: 53
End: 2018-07-23

## 2018-07-23 VITALS — BODY MASS INDEX: 50.02 KG/M2 | TEMPERATURE: 97 F | HEIGHT: 64 IN | WEIGHT: 293 LBS

## 2018-07-23 DIAGNOSIS — M72.2 PLANTAR FASCIITIS: ICD-10-CM

## 2018-07-23 DIAGNOSIS — M20.5X1 HALLUX LIMITUS OF RIGHT FOOT: ICD-10-CM

## 2018-07-23 DIAGNOSIS — M79.671 PAIN IN BOTH FEET: Primary | ICD-10-CM

## 2018-07-23 DIAGNOSIS — M65.871 OTHER SYNOVITIS AND TENOSYNOVITIS, RIGHT ANKLE AND FOOT: ICD-10-CM

## 2018-07-23 DIAGNOSIS — M79.672 PAIN IN BOTH FEET: Primary | ICD-10-CM

## 2018-07-23 DIAGNOSIS — M20.5X2 HALLUX LIMITUS OF LEFT FOOT: ICD-10-CM

## 2018-07-23 DIAGNOSIS — M65.872 OTHER SYNOVITIS AND TENOSYNOVITIS, LEFT ANKLE AND FOOT: ICD-10-CM

## 2018-07-23 ASSESSMENT — ENCOUNTER SYMPTOMS
DIARRHEA: 0
NAUSEA: 0
SHORTNESS OF BREATH: 0
CONSTIPATION: 0
BACK PAIN: 1

## 2018-07-23 NOTE — PROGRESS NOTES
Karsten Jose  (1965)    7/23/18    Subjective     Stephanie Jean is 50 y.o. female who complains today of:    Chief Complaint   Patient presents with    Foot Pain       HPI: the patient presents for follow-up. She reports continued bilateral ankle pain. Patient states that she has been wearing her inserts. She states that their are a year old. She describes having sharp pain and points to the lateral aspect of the foot as the most painful area bilaterally. Review of Systems   Constitutional: Negative for fever. HENT: Negative for hearing loss. Respiratory: Negative for shortness of breath. Gastrointestinal: Negative for constipation, diarrhea and nausea. Genitourinary: Negative for difficulty urinating. Musculoskeletal: Positive for back pain. Negative for neck pain. Skin: Negative for rash. Neurological: Negative for headaches. Hematological: Does not bruise/bleed easily. Psychiatric/Behavioral: Negative for sleep disturbance. She has not tried physical therapy. Date of last of last PT treatment: none      Allergies:  Seasonal    Current Outpatient Prescriptions on File Prior to Visit   Medication Sig Dispense Refill    acetaminophen-codeine (TYLENOL #3) 300-30 MG per tablet Take 1 tablet by mouth daily as needed for Pain for up to 30 days. . 30 tablet 0    tiZANidine (ZANAFLEX) 4 MG tablet Take 1 tablet by mouth 2 times daily as needed (spasm) 30 tablet 2    SUMAtriptan (IMITREX) 50 MG tablet take 1 tablet by mouth once as needed for migraine 9 tablet 5    SUMAtriptan (IMITREX) 50 MG tablet TAKE 1 TABLET BY MOUTH ONCE AS NEEDED FOR MIGRAINE 9 tablet 5    cetirizine (ZYRTEC) 10 MG tablet TAKE ONE TABLET BY MOUTH EVERY DAY 30 tablet 3    vitamin D (CHOLECALCIFEROL) 1000 UNIT TABS tablet Take three tabs a day 60 tablet 0    zoledronic acid (RECLAST) 5 MG/100ML SOLN Infuse 100 mLs intravenously once for 1 dose M81.0 100 mL 0    acyclovir (ZOVIRAX) 400 MG tablet 3. Other synovitis and tenosynovitis, left ankle and foot  ID ARTHROCENTESIS ASPIR&/INJ INTERM JT/BURS W/O US   4. Plantar fasciitis     5. Hallux limitus of right foot     6. Hallux limitus of left foot         Plan:     I have instructed the patient to obtain new medical inserts, she is to bring the devices in to be modified with a first ray cutout to accommodate her hallux limitus. I recommended a local anesthetic/corticosteroid injection to the bilateral sinus tarsi prior to her upcoming vacation. The patient will consider this option. Orders Placed This Encounter   Procedures    ID ARTHROCENTESIS ASPIR&/INJ INTERM JT/BURS W/O US     Bilateral sinus tarsi     Standing Status:   Future     Standing Expiration Date:   10/21/2018         Follow up:  Return in about 6 weeks (around 9/3/2018). Rusty Tobar DPM      Please note that this report has been partially produced using speech recognition software which may cause errors including grammar, punctuation, and spelling or words and phrases that may seem inappropriate. If there are questions or concerns please feel free to contact me for clarification.

## 2018-07-30 ENCOUNTER — PROCEDURE VISIT (OUTPATIENT)
Dept: PODIATRY | Facility: CLINIC | Age: 53
End: 2018-07-30

## 2018-07-30 VITALS — HEIGHT: 64 IN | BODY MASS INDEX: 50.02 KG/M2 | WEIGHT: 293 LBS | TEMPERATURE: 98.2 F

## 2018-07-30 DIAGNOSIS — M79.672 PAIN IN BOTH FEET: ICD-10-CM

## 2018-07-30 DIAGNOSIS — M79.671 PAIN IN BOTH FEET: ICD-10-CM

## 2018-07-30 DIAGNOSIS — M65.871 OTHER SYNOVITIS AND TENOSYNOVITIS, RIGHT ANKLE AND FOOT: ICD-10-CM

## 2018-07-30 DIAGNOSIS — M65.872 OTHER SYNOVITIS AND TENOSYNOVITIS, LEFT ANKLE AND FOOT: ICD-10-CM

## 2018-07-30 RX ORDER — ASPIRIN 325 MG
325 TABLET ORAL PRN
COMMUNITY
End: 2019-12-19 | Stop reason: ALTCHOICE

## 2018-07-30 ASSESSMENT — ENCOUNTER SYMPTOMS
BACK PAIN: 1
DIARRHEA: 0
SHORTNESS OF BREATH: 0
CONSTIPATION: 0
NAUSEA: 0

## 2018-07-30 NOTE — PROGRESS NOTES
(65 FE) MG tablet One PO three times a week. 90 tablet 1    Multiple Vitamins-Minerals (HAIR/SKIN/NAILS PO) Take 2 tablets by mouth daily      Ascorbic Acid (VITAMIN C) 500 MG CAPS Take 1 tablet by mouth daily.  Cyanocobalamin (B-12) 1000 MCG TBCR Take 1 tablet by mouth daily. No current facility-administered medications on file prior to visit. Past Medical History:   Diagnosis Date    B12 deficiency     Bursitis, hip     Bursitis, hip     Chronic back pain     Depression     DJD (degenerative joint disease) of knee     L spine, hips, and knees    H/O blood clots 3/7/13    in lungs-legs where clear    Headache(784.0)     Herpes simplex     History of depression     Neuropathy (HCC)     Vit B12 deff and DM    Obesity     dispite gastric bypass    Vitamin D deficiency      Past Surgical History:   Procedure Laterality Date     SECTION      GASTRIC BYPASS SURGERY  2001    HERNIA REPAIR      JOINT REPLACEMENT Bilateral 10/29/13    DR Yeison Mills    RI COLONOSCOPY W/BIOPSY SINGLE/MULTIPLE N/A 5/10/2018    COLONOSCOPY, (12:00) performed by Heidy Arboleda MD at [address] [place] [address] Right 10/29/13     Social History     Social History    Marital status:      Spouse name: N/A    Number of children: N/A    Years of education: N/A     Occupational History    Not on file.      Social History Main Topics    Smoking status: Never Smoker    Smokeless tobacco: Never Used    Alcohol use No    Drug use: No    Sexual activity: No     Other Topics Concern    Not on file     Social History Narrative    No narrative on file     Family History   Problem Relation Age of Onset    Dementia Father     Heart Disease Father     Parkinsonism Father     Hypertension Mother     High Blood Pressure Mother     Thyroid Cancer Sister            Objective:   Vitals:  Temp 98.2 °F (36.8 °C) (Tympanic)   Ht 5' 4\" (1.626 m)   Wt (!) 310 lb (140.6 kg)   [facility] 12/27/2016   BMI 53.21 kg/m² Pain Score:   7    Physical Exam    Vascular:   Dorsalis pedis pulse is graded at 2/4 bilateral foot. Posterior tibial pulses graded at 1/4 bilateral foot. There is nonpitting edema noted to the bilateral lower extremity.     Neurological:   Protective sensation is intact bilaterally. Vibratory sensation is intact bilaterally. Pain on palpation of the left common peroneal nerve, no radiating pain elicited.    Positive Tinel sign with percussion of the deep peroneal nerve bilateral foot.     Dermatological:  No open lesions noted bilateral foot. Focal, hyperkeratotic lesions noted to the medial hallux bilaterally, the right first MPJ and distal tip of the right third toe. Mild erythema overlying the fourth and fifth PIPJ and third PIPJ bilateral foot.     Musculoskeletal:  Rectus foot type noted nonweightbearing bilaterally. Planus foot type noted with weightbearing. Manual muscle strength is graded at 5/5 for all groups tested bilateral foot. Gross static deformities noted: Tailor's bunion and adductovarus fifth digit bilateral foot.  Semirigid hammertoe deformities noted to toes 2-4 bilateral foot. Pain and crepitus noted with range of motion of the subtalar joint bilateral foot.    Pain on palpation of the sinus tarsi bilateral foot.    Functional hallux limitus noted bilateral foot.    Pain on palpation of the medial calcaneal tubercle bilateral foot.    Decreased ankle joint dorsiflexion noted with the knee both extended and flexed bilateral lower extremity.   Pain on palpation of the deltoid ligament of the left ankle. Assessment:      Diagnosis Orders   1. Pain in both feet  IL ARTHROCENTESIS ASPIR&/INJ INTERM JT/BURS W/O US   2. Other synovitis and tenosynovitis, right ankle and foot  IL ARTHROCENTESIS ASPIR&/INJ INTERM JT/BURS W/O US   3.  Other synovitis and tenosynovitis, left ankle and foot  IL ARTHROCENTESIS ASPIR&/INJ INTERM JT/BURS W/O

## 2018-07-30 NOTE — PATIENT INSTRUCTIONS
Patient Education        Joint Injections: Care Instructions  Your Care Instructions    Joint injections are shots into a joint, such as the knee. They may be used to put in medicines, such as pain relievers. A corticosteroid, or steroid, shot is used to reduce inflammation in tendons or joints. It is often used to treat problems such as arthritis, tendinitis, and bursitis. Steroids can be injected directly into a painful, inflamed joint. They can also help reduce inflammation of a bursa. A bursa is a sac of fluid. It cushions and lubricates areas where tendons, ligaments, skin, muscles, or bones rub against each other. A steroid shot can sometimes help with short-term pain relief when other treatments haven't worked. If steroid shots help, pain may improve for weeks or months. Follow-up care is a key part of your treatment and safety. Be sure to make and go to all appointments, and call your doctor if you are having problems. It's also a good idea to know your test results and keep a list of the medicines you take. How can you care for yourself at home? · Put ice or a cold pack on the area for 10 to 20 minutes at a time. Put a thin cloth between the ice and your skin. · Ask your doctor if you can take an over-the-counter pain medicine, such as acetaminophen (Tylenol), ibuprofen (Advil, Motrin), or naproxen (Aleve). Be safe with medicines. Read and follow all instructions on the label. · Avoid strenuous activities for several days. In particular, avoid ones that put stress on the area where you got the shot. · If you have dressings over the area, keep them clean and dry. You may remove them when your doctor tells you to. When should you call for help? Call your doctor now or seek immediate medical care if:    · You have signs of infection, such as:  ¨ Increased pain, swelling, warmth, or redness. ¨ Red streaks leading from the site. ¨ Pus draining from the site.   ¨ A fever.    Watch closely for changes in your health, and be sure to contact your doctor if you have any problems. Where can you learn more? Go to [email or web address] org and sign in to your mSpot account. Enter N616 in the Gourmant box to learn more about \"Joint Injections: Care Instructions. \"     If you do not have an account, please click on the \"Sign Up Now\" link. Current as of: March 24, 2017  Content Version: 11.6  © 3441-7075 [employer]. Care instructions adapted under license by [facility] ([facility]). If you have questions about a medical condition or this instruction, always ask your healthcare professional. [address] any warranty or liability for your use of this information.

## 2018-08-20 RX ORDER — CETIRIZINE HYDROCHLORIDE 10 MG/1
TABLET ORAL
Qty: 30 TABLET | Refills: 2 | Status: SHIPPED | OUTPATIENT
Start: 2018-08-20 | End: 2018-11-14 | Stop reason: SDUPTHER

## 2018-10-17 ENCOUNTER — OFFICE VISIT (OUTPATIENT)
Dept: PODIATRY | Facility: CLINIC | Age: 53
End: 2018-10-17

## 2018-10-17 VITALS — BODY MASS INDEX: 50.02 KG/M2 | HEIGHT: 64 IN | TEMPERATURE: 98 F | WEIGHT: 293 LBS

## 2018-10-17 DIAGNOSIS — M79.672 PAIN IN BOTH FEET: Primary | ICD-10-CM

## 2018-10-17 DIAGNOSIS — G57.92 NEURITIS OF LEFT FOOT: ICD-10-CM

## 2018-10-17 DIAGNOSIS — M19.072 PRIMARY OSTEOARTHRITIS OF BOTH FEET: ICD-10-CM

## 2018-10-17 DIAGNOSIS — M65.872 OTHER SYNOVITIS AND TENOSYNOVITIS, LEFT ANKLE AND FOOT: ICD-10-CM

## 2018-10-17 DIAGNOSIS — M65.871 OTHER SYNOVITIS AND TENOSYNOVITIS, RIGHT ANKLE AND FOOT: ICD-10-CM

## 2018-10-17 DIAGNOSIS — M19.071 PRIMARY OSTEOARTHRITIS OF BOTH FEET: ICD-10-CM

## 2018-10-17 DIAGNOSIS — G57.91 NEURITIS OF RIGHT FOOT: ICD-10-CM

## 2018-10-17 DIAGNOSIS — M79.671 PAIN IN BOTH FEET: Primary | ICD-10-CM

## 2018-10-17 ASSESSMENT — ENCOUNTER SYMPTOMS
CONSTIPATION: 0
BACK PAIN: 1
DIARRHEA: 0
SHORTNESS OF BREATH: 0
NAUSEA: 0

## 2018-10-18 ENCOUNTER — NURSE ONLY (OUTPATIENT)
Dept: INTERNAL MEDICINE CLINIC | Age: 53
End: 2018-10-18
Payer: COMMERCIAL

## 2018-10-18 DIAGNOSIS — Z23 NEED FOR INFLUENZA VACCINATION: Primary | ICD-10-CM

## 2018-10-18 PROCEDURE — 90688 IIV4 VACCINE SPLT 0.5 ML IM: CPT | Performed by: FAMILY MEDICINE

## 2018-10-18 PROCEDURE — 90471 IMMUNIZATION ADMIN: CPT | Performed by: FAMILY MEDICINE

## 2018-10-18 NOTE — PROGRESS NOTES
Vaccine Information Sheet, \"Influenza - Inactivated\"  given to Alfreda La, or parent/legal guardian of  Alfreda La and verbalized understanding. Patient responses:    Have you ever had a reaction to a flu vaccine? No  Are you able to eat eggs without adverse effects? Yes  Do you have any current illness? No  Have you ever had Guillian [place] Syndrome? No    Flu vaccine given per order. Please see immunization tab.

## 2018-10-22 ENCOUNTER — TELEPHONE (OUTPATIENT)
Dept: PODIATRY | Facility: CLINIC | Age: 53
End: 2018-10-22

## 2018-10-29 ENCOUNTER — PROCEDURE VISIT (OUTPATIENT)
Dept: PODIATRY | Facility: CLINIC | Age: 53
End: 2018-10-29

## 2018-10-29 VITALS — BODY MASS INDEX: 50.02 KG/M2 | WEIGHT: 293 LBS | TEMPERATURE: 98 F | HEIGHT: 64 IN

## 2018-10-29 DIAGNOSIS — M65.872 OTHER SYNOVITIS AND TENOSYNOVITIS, LEFT ANKLE AND FOOT: ICD-10-CM

## 2018-10-29 DIAGNOSIS — M79.671 PAIN IN BOTH FEET: ICD-10-CM

## 2018-10-29 DIAGNOSIS — M65.871 OTHER SYNOVITIS AND TENOSYNOVITIS, RIGHT ANKLE AND FOOT: ICD-10-CM

## 2018-10-29 DIAGNOSIS — M79.672 PAIN IN BOTH FEET: ICD-10-CM

## 2018-11-15 RX ORDER — CETIRIZINE HYDROCHLORIDE 10 MG/1
TABLET ORAL
Qty: 30 TABLET | Refills: 5 | Status: SHIPPED | OUTPATIENT
Start: 2018-11-15 | End: 2019-05-18 | Stop reason: SDUPTHER

## 2019-04-16 ENCOUNTER — OFFICE VISIT (OUTPATIENT)
Dept: INTERNAL MEDICINE CLINIC | Age: 54
End: 2019-04-16
Payer: COMMERCIAL

## 2019-04-16 VITALS
OXYGEN SATURATION: 100 % | WEIGHT: 293 LBS | RESPIRATION RATE: 16 BRPM | DIASTOLIC BLOOD PRESSURE: 84 MMHG | HEIGHT: 64 IN | BODY MASS INDEX: 50.02 KG/M2 | SYSTOLIC BLOOD PRESSURE: 118 MMHG | TEMPERATURE: 97.7 F | HEART RATE: 61 BPM

## 2019-04-16 DIAGNOSIS — Z12.31 VISIT FOR SCREENING MAMMOGRAM: ICD-10-CM

## 2019-04-16 DIAGNOSIS — Z13.1 SCREENING FOR DIABETES MELLITUS: ICD-10-CM

## 2019-04-16 DIAGNOSIS — F33.1 MODERATE EPISODE OF RECURRENT MAJOR DEPRESSIVE DISORDER (HCC): Primary | ICD-10-CM

## 2019-04-16 DIAGNOSIS — M81.0 SENILE OSTEOPOROSIS: ICD-10-CM

## 2019-04-16 DIAGNOSIS — R53.83 FATIGUE, UNSPECIFIED TYPE: ICD-10-CM

## 2019-04-16 DIAGNOSIS — Z23 NEED FOR TDAP VACCINATION: ICD-10-CM

## 2019-04-16 LAB
ALBUMIN SERPL-MCNC: 4.3 G/DL (ref 3.5–4.6)
ALP BLD-CCNC: 66 U/L (ref 40–130)
ALT SERPL-CCNC: 15 U/L (ref 0–33)
ANION GAP SERPL CALCULATED.3IONS-SCNC: 16 MEQ/L (ref 9–15)
AST SERPL-CCNC: 23 U/L (ref 0–35)
BILIRUB SERPL-MCNC: 0.3 MG/DL (ref 0.2–0.7)
BUN BLDV-MCNC: 16 MG/DL (ref 6–20)
CALCIUM SERPL-MCNC: 8.7 MG/DL (ref 8.5–9.9)
CHLORIDE BLD-SCNC: 103 MEQ/L (ref 95–107)
CO2: 21 MEQ/L (ref 20–31)
CREAT SERPL-MCNC: 0.59 MG/DL (ref 0.5–0.9)
GFR AFRICAN AMERICAN: >60
GFR NON-AFRICAN AMERICAN: >60
GLOBULIN: 2.4 G/DL (ref 2.3–3.5)
GLUCOSE BLD-MCNC: 75 MG/DL (ref 70–99)
HBA1C MFR BLD: 4.9 % (ref 4.8–5.9)
POTASSIUM SERPL-SCNC: 4.6 MEQ/L (ref 3.4–4.9)
SODIUM BLD-SCNC: 140 MEQ/L (ref 135–144)
TOTAL PROTEIN: 6.7 G/DL (ref 6.3–8)
TSH SERPL DL<=0.05 MIU/L-ACNC: 1.42 UIU/ML (ref 0.44–3.86)

## 2019-04-16 PROCEDURE — 90715 TDAP VACCINE 7 YRS/> IM: CPT | Performed by: FAMILY MEDICINE

## 2019-04-16 PROCEDURE — G0444 DEPRESSION SCREEN ANNUAL: HCPCS | Performed by: FAMILY MEDICINE

## 2019-04-16 PROCEDURE — 90471 IMMUNIZATION ADMIN: CPT | Performed by: FAMILY MEDICINE

## 2019-04-16 PROCEDURE — 99214 OFFICE O/P EST MOD 30 MIN: CPT | Performed by: FAMILY MEDICINE

## 2019-04-16 RX ORDER — DULOXETIN HYDROCHLORIDE 30 MG/1
30 CAPSULE, DELAYED RELEASE ORAL DAILY
Qty: 90 CAPSULE | Refills: 1 | Status: SHIPPED | OUTPATIENT
Start: 2019-04-16 | End: 2019-10-04 | Stop reason: SDUPTHER

## 2019-04-16 ASSESSMENT — PATIENT HEALTH QUESTIONNAIRE - PHQ9
SUM OF ALL RESPONSES TO PHQ QUESTIONS 1-9: 10
SUM OF ALL RESPONSES TO PHQ9 QUESTIONS 1 & 2: 4
6. FEELING BAD ABOUT YOURSELF - OR THAT YOU ARE A FAILURE OR HAVE LET YOURSELF OR YOUR FAMILY DOWN: 2
4. FEELING TIRED OR HAVING LITTLE ENERGY: 2
3. TROUBLE FALLING OR STAYING ASLEEP: 1
SUM OF ALL RESPONSES TO PHQ QUESTIONS 1-9: 10
1. LITTLE INTEREST OR PLEASURE IN DOING THINGS: 2
5. POOR APPETITE OR OVEREATING: 1
7. TROUBLE CONCENTRATING ON THINGS, SUCH AS READING THE NEWSPAPER OR WATCHING TELEVISION: 0
2. FEELING DOWN, DEPRESSED OR HOPELESS: 2
10. IF YOU CHECKED OFF ANY PROBLEMS, HOW DIFFICULT HAVE THESE PROBLEMS MADE IT FOR YOU TO DO YOUR WORK, TAKE CARE OF THINGS AT HOME, OR GET ALONG WITH OTHER PEOPLE: 1
9. THOUGHTS THAT YOU WOULD BE BETTER OFF DEAD, OR OF HURTING YOURSELF: 0
8. MOVING OR SPEAKING SO SLOWLY THAT OTHER PEOPLE COULD HAVE NOTICED. OR THE OPPOSITE, BEING SO FIGETY OR RESTLESS THAT YOU HAVE BEEN MOVING AROUND A LOT MORE THAN USUAL: 0

## 2019-04-16 NOTE — PROGRESS NOTES
Patient: Dayne Mojica    YOB: 1965    Date: 4/16/19    Chief Complaint   Patient presents with    Depression     She complains of being depressed, alot of stress at home, worrying. she would like to discuss having DEXA scan done.  Immunizations     Tdap pending       Patient Active Problem List    Diagnosis Date Noted    S/P bariatric surgery 03/27/2018    Spondylosis of lumbar region without myelopathy or radiculopathy 12/21/2015    Migraine without aura and without status migrainosus, not intractable 08/20/2015    Depression 06/19/2013    DJD (degenerative joint disease), lumbar 03/06/2013    BMI 50.0-59.9, adult ([facility] 03/06/2013    Osteoporosis 02/20/2013    Osteoarthritis 10/01/2012    DJD (degenerative joint disease) of knee     B12 deficiency        Allergies   Allergen Reactions    Seasonal        Vitals:    04/16/19 0836   BP: 118/84   Site: Right Upper Arm   Position: Sitting   Cuff Size: Large Adult   Pulse: 61   Resp: 16   Temp: 97.7 °F (36.5 °C)   TempSrc: Oral   SpO2: 100%   Weight: (!) 320 lb (145.2 kg)   Height: 5' 4\" (1.626 m)      Body mass index is 54.93 kg/m². HPI    She's here to discuss her recurrent depression. It runs in her family her brothers had suicide attempts her son is extremely depressed and she's been dealing with this. She's been on and off antidepressants in the past.  She also wanted discuss her weight at all together we spent a good 30 minutes discussing these issues. She is not suicidal she has no ideas of hurting herself or others but she's just feeling very tired overwhelmed crying easily and just feeling her motions very ragged. Review of Systems    Constitutional: Negative for fatigue, fever and sweats. HEENT: Negative for eye discharge and vision loss. Negative for ear drainage, hearing loss and nasal drainage. Respiratory: Negative for cough, dyspnea and wheezing.   Cardiovascular:  Negative for chest pain, claudication and irregular heartbeat/palpitations. Gastrointestinal: Negative for abdominal pain, nausea, constipation and diarrhea. Genitourinary: Negative for dysuria,patient postmenopausal.  Metabolic/Endocrine: Negative for cold intolerance, heat intolerance, polydipsia and polyphagia. No unintended weight loss or weight gain. Neuro/Psychiatric: Negative for gait disturbance. Negative for psychiatric symptoms. Dermatologic: Negative for pruritus and rash. Musculoskeletal: positive for bone/joint symptoms. No numbness or tingling. No loss of function. Hematology: Negative for bleeding and easy bruising. Immunology:  Negative for environmental allergies and food allergies. Physical Exam    Patient's medication, allergies, past medical, surgical, social and family histories were reviewed and updated as appropriate. PHYSICAL EXAM   General appearance: Alert oriented pleasant cooperative in no acute distress. Teary-eyed  Lungs: Clear to auscultation  Heart: Regular rate and rhythm. Assessment:   Diagnosis Orders   1. Moderate episode of recurrent major depressive disorder (HCC)  DULoxetine (CYMBALTA) 30 MG extended release capsule   2. Need for Tdap vaccination  Tdap (age 10y-63y) IM ([facility])   3. Visit for screening mammogram  EBONY DIGITAL SCREEN W CAD BILATERAL   4. Senile osteoporosis  DEXA Bone Density Axial Skeleton   5. Fatigue, unspecified type  Comprehensive Metabolic Panel    TSH without Reflex   6. Screening for diabetes mellitus  Hemoglobin A1C   7. BMI 50.0-59.9, adult [facility])  She has to find a diet she can live with and modify start her lifestyle. We talked a great deal about this and I declined giving her diet pills.                  Plan:  Current Outpatient Medications   Medication Sig Dispense Refill    DULoxetine (CYMBALTA) 30 MG extended release capsule Take 1 capsule by mouth daily 90 capsule 1    cetirizine (ZYRTEC) 10 MG tablet TAKE ONE TABLET BY MOUTH EVERY DAY 30 tablet 5    aspirin 325 MG tablet Take 325 mg by mouth as needed for Pain      SUMAtriptan (IMITREX) 50 MG tablet take 1 tablet by mouth once as needed for migraine 9 tablet 5    vitamin D (CHOLECALCIFEROL) 1000 UNIT TABS tablet Take three tabs a day 60 tablet 0    zoledronic acid (RECLAST) 5 MG/100ML SOLN Infuse 100 mLs intravenously once for 1 dose M81.0 100 mL 0    ferrous sulfate 325 (65 FE) MG tablet One PO three times a week. 90 tablet 1    Multiple Vitamins-Minerals (HAIR/SKIN/NAILS PO) Take 2 tablets by mouth daily      Ascorbic Acid (VITAMIN C) 500 MG CAPS Take 1 tablet by mouth daily.  Cyanocobalamin (B-12) 1000 MCG TBCR Take 1 tablet by mouth daily. No current facility-administered medications for this visit. Orders Placed This Encounter   Procedures    EBONY DIGITAL SCREEN W CAD BILATERAL     US if needed     Standing Status:   Future     Standing Expiration Date:   4/15/2020     Order Specific Question:   Reason for exam:     Answer:   routine    DEXA Bone Density Axial Skeleton     Standing Status:   Future     Standing Expiration Date:   4/15/2020    Tdap (age 10y-63y) IM ([facility])    Comprehensive Metabolic Panel     Standing Status:   Future     Standing Expiration Date:   4/15/2020    Hemoglobin A1C     Standing Status:   Future     Standing Expiration Date:   4/16/2020    TSH without Reflex     Standing Status:   Future     Standing Expiration Date:   4/15/2020       Orders Placed This Encounter   Medications    DULoxetine (CYMBALTA) 30 MG extended release capsule     Sig: Take 1 capsule by mouth daily     Dispense:  90 capsule     Refill:  1              Return in about 2 months (around 6/16/2019).     Dr. Jeannette Gruber      4/16/19  9:48 AM

## 2019-05-09 ENCOUNTER — HOSPITAL ENCOUNTER (OUTPATIENT)
Dept: WOMENS IMAGING | Age: 54
Discharge: HOME | End: 2019-05-11
Payer: COMMERCIAL

## 2019-05-09 DIAGNOSIS — M81.0 SENILE OSTEOPOROSIS: ICD-10-CM

## 2019-05-09 DIAGNOSIS — Z12.31 VISIT FOR SCREENING MAMMOGRAM: ICD-10-CM

## 2019-05-09 PROCEDURE — 77081 DXA BONE DENSITY APPENDICULR: CPT

## 2019-05-09 PROCEDURE — 77063 BREAST TOMOSYNTHESIS BI: CPT

## 2019-05-15 ENCOUNTER — TELEPHONE (OUTPATIENT)
Dept: FAMILY MEDICINE CLINIC | Age: 54
End: 2019-05-15

## 2019-05-16 ENCOUNTER — TELEPHONE (OUTPATIENT)
Dept: FAMILY MEDICINE CLINIC | Age: 54
End: 2019-05-16

## 2019-05-18 NOTE — TELEPHONE ENCOUNTER
requesting medication refill.      Rx requested:  Requested Prescriptions     Pending Prescriptions Disp Refills    cetirizine (ZYRTEC) 10 MG tablet [Pharmacy Med Name: Cetirizine HCl Oral Tablet 10 MG] 30 tablet 4     Sig: TAKE ONE TABLET BY MOUTH EVERY DAY       Last Office Visit:   4/16/2019        Next Visit Date:  Future Appointments   Date Time Provider Brenda Ponce   6/20/2019  8:45 AM Iris Hawkins  [place], [place] [address]

## 2019-05-19 RX ORDER — CETIRIZINE HYDROCHLORIDE 10 MG/1
TABLET ORAL
Qty: 30 TABLET | Refills: 4 | Status: SHIPPED | OUTPATIENT
Start: 2019-05-19 | End: 2019-10-04 | Stop reason: SDUPTHER

## 2019-06-11 RX ORDER — TIZANIDINE 4 MG/1
4 TABLET ORAL 3 TIMES DAILY PRN
Qty: 30 TABLET | Refills: 2 | Status: SHIPPED | OUTPATIENT
Start: 2019-06-11 | End: 2019-08-12 | Stop reason: SDUPTHER

## 2019-06-20 ENCOUNTER — OFFICE VISIT (OUTPATIENT)
Dept: FAMILY MEDICINE CLINIC | Age: 54
End: 2019-06-20
Payer: COMMERCIAL

## 2019-06-20 VITALS
HEART RATE: 67 BPM | OXYGEN SATURATION: 98 % | BODY MASS INDEX: 50.02 KG/M2 | TEMPERATURE: 97.8 F | DIASTOLIC BLOOD PRESSURE: 84 MMHG | WEIGHT: 293 LBS | SYSTOLIC BLOOD PRESSURE: 128 MMHG | HEIGHT: 64 IN | RESPIRATION RATE: 16 BRPM

## 2019-06-20 DIAGNOSIS — M81.8 OTHER OSTEOPOROSIS WITHOUT CURRENT PATHOLOGICAL FRACTURE: ICD-10-CM

## 2019-06-20 DIAGNOSIS — F33.41 RECURRENT MAJOR DEPRESSIVE DISORDER, IN PARTIAL REMISSION (HCC): Primary | ICD-10-CM

## 2019-06-20 PROCEDURE — 99213 OFFICE O/P EST LOW 20 MIN: CPT | Performed by: FAMILY MEDICINE

## 2019-06-20 RX ORDER — ZOLEDRONIC ACID 5 MG/100ML
5 INJECTION, SOLUTION INTRAVENOUS ONCE
Qty: 100 ML | Refills: 0 | Status: SHIPPED | OUTPATIENT
Start: 2019-06-20 | End: 2020-06-18 | Stop reason: SDUPTHER

## 2019-06-20 NOTE — PROGRESS NOTES
Patient: Ara Bustillo    YOB: 1965    Date: 6/20/19    Chief Complaint   Patient presents with    Depression     2 month follow up. She is doing better.  Results     She had labs done 4/2019, Mammogram and DEXA done 5/9/2019       Patient Active Problem List    Diagnosis Date Noted    S/P bariatric surgery 03/27/2018    Spondylosis of lumbar region without myelopathy or radiculopathy 12/21/2015    Migraine without aura and without status migrainosus, not intractable 08/20/2015    Depression 06/19/2013    DJD (degenerative joint disease), lumbar 03/06/2013    BMI 50.0-59.9, adult ([facility] 03/06/2013    Osteoporosis 02/20/2013    Osteoarthritis 10/01/2012    DJD (degenerative joint disease) of knee     B12 deficiency        Allergies   Allergen Reactions    Seasonal        Vitals:    06/20/19 0836   BP: 128/84   Site: Right Upper Arm   Position: Sitting   Cuff Size: Large Adult   Pulse: 67   Resp: 16   Temp: 97.8 °F (36.6 °C)   TempSrc: Oral   SpO2: 98%   Weight: (!) 312 lb (141.5 kg)   Height: 5' 4\" (1.626 m)      Body mass index is 53.55 kg/m². HPI    She comes in to follow-up on several issues. First her bone density was normal and she is had 3 doses of Reclast and she is really responded beautifully. However I would like her to get a full 5 years and she is agreeable to that. So she will need 2 more doses. Also her lab work was excellent and the Cymbalta is working well for her. Review of Systems    Constitutional: Negative for fatigue, fever and sweats. HEENT: Negative for eye discharge and vision loss. Negative for ear drainage, hearing loss and nasal drainage. Respiratory: Negative for cough, dyspnea and wheezing. Cardiovascular:  Negative for chest pain, claudication and irregular heartbeat/palpitations. Gastrointestinal: Negative for abdominal pain, nausea, constipation and diarrhea.   Genitourinary: Negative for dysuria, patient postmenopausal.  Metabolic/Endocrine: Negative for cold intolerance, heat intolerance, polydipsia and polyphagia. No unintended weight loss or weight gain. Neuro/Psychiatric: Negative for gait disturbance. Negative for psychiatric symptoms. Dermatologic: Negative for pruritus and rash. Musculoskeletal: positive for bone/joint symptoms. No numbness or tingling. No loss of function. Hematology: Negative for bleeding and easy bruising. Immunology:  Negative for environmental allergies and food allergies. Physical Exam    Patient's medication, allergies, past medical, surgical, social and family histories were reviewed and updated as appropriate. PHYSICAL EXAM   General appearance: Alert oriented pleasant cooperative no acute distress. Weight down  HEENT: Eyes clear nonicteric facial muscles symmetrical.  Her affect is much more upbeat  Lungs: Clear to auscultation  Heart: Regular rate and rhythm          Assessment:   Diagnosis Orders   1. Recurrent major depressive disorder, in partial remission ([facility]   continue Cymbalta   2.  Other osteoporosis without current pathological fracture  zoledronic acid (RECLAST) 5 MG/100ML SOLN               Plan:  Current Outpatient Medications   Medication Sig Dispense Refill    zoledronic acid (RECLAST) 5 MG/100ML SOLN Infuse 100 mLs intravenously once for 1 dose M81.0 100 mL 0    tiZANidine (ZANAFLEX) 4 MG tablet Take 1 tablet by mouth 3 times daily as needed (back spasm) 30 tablet 2    cetirizine (ZYRTEC) 10 MG tablet TAKE ONE TABLET BY MOUTH EVERY DAY 30 tablet 4    DULoxetine (CYMBALTA) 30 MG extended release capsule Take 1 capsule by mouth daily 90 capsule 1    aspirin 325 MG tablet Take 325 mg by mouth as needed for Pain      SUMAtriptan (IMITREX) 50 MG tablet take 1 tablet by mouth once as needed for migraine 9 tablet 5    vitamin D (CHOLECALCIFEROL) 1000 UNIT TABS tablet Take three tabs a day (Patient taking differently: Take two tabs a day) 60 tablet 0   

## 2019-07-03 RX ORDER — SUMATRIPTAN 50 MG/1
TABLET, FILM COATED ORAL
Qty: 9 TABLET | Refills: 4 | OUTPATIENT
Start: 2019-07-03

## 2019-07-03 RX ORDER — SUMATRIPTAN 50 MG/1
TABLET, FILM COATED ORAL
Qty: 9 TABLET | Refills: 5 | Status: SHIPPED | OUTPATIENT
Start: 2019-07-03 | End: 2020-10-15

## 2019-08-13 RX ORDER — TIZANIDINE 4 MG/1
TABLET ORAL
Qty: 30 TABLET | Refills: 1 | Status: SHIPPED | OUTPATIENT
Start: 2019-08-13 | End: 2019-09-25 | Stop reason: SDUPTHER

## 2019-09-26 RX ORDER — TIZANIDINE 4 MG/1
TABLET ORAL
Qty: 30 TABLET | Refills: 1 | Status: SHIPPED | OUTPATIENT
Start: 2019-09-26 | End: 2019-11-18 | Stop reason: SDUPTHER

## 2019-11-19 RX ORDER — TIZANIDINE 4 MG/1
TABLET ORAL
Qty: 30 TABLET | Refills: 1 | Status: SHIPPED | OUTPATIENT
Start: 2019-11-19 | End: 2020-01-13

## 2019-12-04 ENCOUNTER — OFFICE VISIT (OUTPATIENT)
Dept: PODIATRY | Facility: CLINIC | Age: 54
End: 2019-12-04

## 2019-12-04 VITALS — TEMPERATURE: 98.7 F | BODY MASS INDEX: 50.02 KG/M2 | WEIGHT: 293 LBS | HEIGHT: 64 IN

## 2019-12-04 DIAGNOSIS — M19.071 PRIMARY OSTEOARTHRITIS OF BOTH FEET: ICD-10-CM

## 2019-12-04 DIAGNOSIS — M79.672 LEFT FOOT PAIN: ICD-10-CM

## 2019-12-04 DIAGNOSIS — M67.471 GANGLION CYST OF RIGHT FOOT: ICD-10-CM

## 2019-12-04 DIAGNOSIS — M19.072 PRIMARY OSTEOARTHRITIS OF BOTH FEET: ICD-10-CM

## 2019-12-04 DIAGNOSIS — M79.671 RIGHT FOOT PAIN: Primary | ICD-10-CM

## 2019-12-04 RX ORDER — CEPHALEXIN 500 MG/1
500 CAPSULE ORAL 3 TIMES DAILY
Qty: 15 CAPSULE | Refills: 0 | Status: SHIPPED | OUTPATIENT
Start: 2019-12-04 | End: 2019-12-09

## 2019-12-04 RX ORDER — CELECOXIB 100 MG/1
100 CAPSULE ORAL 2 TIMES DAILY
Qty: 28 CAPSULE | Refills: 0 | Status: SHIPPED | OUTPATIENT
Start: 2019-12-04 | End: 2019-12-19 | Stop reason: ALTCHOICE

## 2019-12-04 ASSESSMENT — ENCOUNTER SYMPTOMS
NAUSEA: 0
BACK PAIN: 1
CONSTIPATION: 0
SHORTNESS OF BREATH: 0
DIARRHEA: 0

## 2019-12-05 ENCOUNTER — TELEPHONE (OUTPATIENT)
Dept: PODIATRY | Facility: CLINIC | Age: 54
End: 2019-12-05

## 2019-12-05 ENCOUNTER — TELEPHONE (OUTPATIENT)
Dept: FAMILY MEDICINE CLINIC | Age: 54
End: 2019-12-05

## 2019-12-11 ENCOUNTER — PROCEDURE VISIT (OUTPATIENT)
Dept: PODIATRY | Facility: CLINIC | Age: 54
End: 2019-12-11

## 2019-12-11 VITALS — WEIGHT: 293 LBS | HEIGHT: 64 IN | BODY MASS INDEX: 50.02 KG/M2 | TEMPERATURE: 97.7 F

## 2019-12-11 DIAGNOSIS — M79.671 RIGHT FOOT PAIN: ICD-10-CM

## 2019-12-11 DIAGNOSIS — M67.471 GANGLION CYST OF RIGHT FOOT: ICD-10-CM

## 2019-12-11 DIAGNOSIS — M76.72 PERONEAL TENDINITIS OF LEFT LOWER EXTREMITY: ICD-10-CM

## 2019-12-11 DIAGNOSIS — M79.672 LEFT FOOT PAIN: Primary | ICD-10-CM

## 2019-12-11 ASSESSMENT — ENCOUNTER SYMPTOMS
BACK PAIN: 1
SHORTNESS OF BREATH: 0
NAUSEA: 0
CONSTIPATION: 0
DIARRHEA: 0

## 2019-12-19 ENCOUNTER — OFFICE VISIT (OUTPATIENT)
Dept: FAMILY MEDICINE CLINIC | Age: 54
End: 2019-12-19
Payer: COMMERCIAL

## 2019-12-19 VITALS
DIASTOLIC BLOOD PRESSURE: 86 MMHG | HEART RATE: 67 BPM | WEIGHT: 293 LBS | OXYGEN SATURATION: 99 % | RESPIRATION RATE: 16 BRPM | TEMPERATURE: 97.6 F | SYSTOLIC BLOOD PRESSURE: 110 MMHG | BODY MASS INDEX: 50.02 KG/M2 | HEIGHT: 64 IN

## 2019-12-19 DIAGNOSIS — Z23 NEED FOR INFLUENZA VACCINATION: ICD-10-CM

## 2019-12-19 DIAGNOSIS — M15.9 PRIMARY OSTEOARTHRITIS INVOLVING MULTIPLE JOINTS: Primary | ICD-10-CM

## 2019-12-19 DIAGNOSIS — F33.41 RECURRENT MAJOR DEPRESSIVE DISORDER, IN PARTIAL REMISSION (HCC): ICD-10-CM

## 2019-12-19 PROCEDURE — 90471 IMMUNIZATION ADMIN: CPT | Performed by: FAMILY MEDICINE

## 2019-12-19 PROCEDURE — 99213 OFFICE O/P EST LOW 20 MIN: CPT | Performed by: FAMILY MEDICINE

## 2019-12-19 PROCEDURE — 90688 IIV4 VACCINE SPLT 0.5 ML IM: CPT | Performed by: FAMILY MEDICINE

## 2020-01-07 ENCOUNTER — OFFICE VISIT (OUTPATIENT)
Dept: FAMILY MEDICINE CLINIC | Age: 55
End: 2020-01-07
Payer: COMMERCIAL

## 2020-01-07 VITALS
HEIGHT: 64 IN | SYSTOLIC BLOOD PRESSURE: 128 MMHG | WEIGHT: 293 LBS | RESPIRATION RATE: 16 BRPM | DIASTOLIC BLOOD PRESSURE: 82 MMHG | TEMPERATURE: 98.3 F | BODY MASS INDEX: 50.02 KG/M2 | OXYGEN SATURATION: 98 % | HEART RATE: 72 BPM

## 2020-01-07 PROCEDURE — 99213 OFFICE O/P EST LOW 20 MIN: CPT | Performed by: FAMILY MEDICINE

## 2020-01-07 RX ORDER — AMOXICILLIN AND CLAVULANATE POTASSIUM 875; 125 MG/1; MG/1
1 TABLET, FILM COATED ORAL 2 TIMES DAILY
Qty: 20 TABLET | Refills: 0 | Status: SHIPPED | OUTPATIENT
Start: 2020-01-07 | End: 2020-01-28 | Stop reason: ALTCHOICE

## 2020-01-07 NOTE — PROGRESS NOTES
Patient: Ana Harper    YOB: 1965    Date: 1/7/20    Chief Complaint   Patient presents with    Sinusitis     She complains of sinus pressure, pain, headache and postnasal drip.  Cough     She complains of nonproductive cough for 4 days. Patient Active Problem List    Diagnosis Date Noted    S/P bariatric surgery 03/27/2018    Spondylosis of lumbar region without myelopathy or radiculopathy 12/21/2015    Migraine without aura and without status migrainosus, not intractable 08/20/2015    Depression 06/19/2013    DJD (degenerative joint disease), lumbar 03/06/2013    BMI 50.0-59.9, adult ([facility] 03/06/2013    Osteoporosis 02/20/2013    Osteoarthritis 10/01/2012    DJD (degenerative joint disease) of knee     B12 deficiency        Allergies   Allergen Reactions    Seasonal        Vitals:    01/07/20 0816   BP: 128/82   Site: Right Upper Arm   Position: Sitting   Cuff Size: Large Adult   Pulse: 72   Resp: 16   Temp: 98.3 °F (36.8 °C)   TempSrc: Oral   SpO2: 98%   Weight: (!) 315 lb (142.9 kg)   Height: 5' 4\" (1.626 m)      Body mass index is 54.07 kg/m². HPI    She has been battling sinus congestion for the last few months mostly on the left side. Over the last few days though she has had more congestion and cough that is minimally productive. No fever chills ear pain or sore throat. When she blows her nose sometimes there is some yellow discharge. She does not smoke. Review of Systems    Constitutional: Negative for fatigue, fever and sweats. HEENT: Negative for eye discharge and vision loss. Negative for ear drainage, hearing loss and positive for nasal drainage. Respiratory: positive for cough, negative for dyspnea and wheezing. Cardiovascular:  Negative for chest pain, claudication and irregular heartbeat/palpitations. Gastrointestinal: Negative for abdominal pain, nausea, constipation and diarrhea.   Genitourinary: Negative for dysuria, patient postmenopausal.  Metabolic/Endocrine: Negative for cold intolerance, heat intolerance, polydipsia and polyphagia. No unintended weight loss or weight gain. Neuro/Psychiatric: Negative for gait disturbance. Negative for psychiatric symptoms. Dermatologic: Negative for pruritus and rash. Musculoskeletal: positive for bone/joint symptoms. No numbness or tingling. No loss of function. Hematology: Negative for bleeding and easy bruising. Immunology:  Negative for environmental allergies and food allergies. Physical Exam    Patient's medication, allergies, past medical, surgical, social and family histories were reviewed and updated as appropriate. PHYSICAL EXAM   General appearance: Alert oriented pleasant cooperative no acute distress. HEENT: Eyes clear nonicteric facial muscles symmetrical.  Did not appreciate any pain to palpation of the sinuses. Ears right TM and canal normal left TM erythematous canal normal, oropharynx hyperemic worse on the left than the right  Neck: Soft nontender she has adenopathy that is palpable on the left but small easily movable  Lungs: Clear to auscultation without wheezes rhonchi or rales  Heart: Regular rate and rhythm without murmurs rubs or gallops          Assessment:   Diagnosis Orders   1. Upper respiratory tract infection, unspecified type  amoxicillin-clavulanate (AUGMENTIN) 875-125 MG per tablet               Plan:  Current Outpatient Medications   Medication Sig Dispense Refill    amoxicillin-clavulanate (AUGMENTIN) 875-125 MG per tablet Take 1 tablet by mouth 2 times daily 20 tablet 0    tiZANidine (ZANAFLEX) 4 MG tablet TAKE 1 TABLET BY MOUTH 3 TIMES A DAY AS NEEDED.  FOR BACK SPASM  30 tablet 1    cetirizine (ZYRTEC) 10 MG tablet TAKE ONE TABLET BY MOUTH EVERY DAY 30 tablet 3    DULoxetine (CYMBALTA) 30 MG extended release capsule TAKE ONE CAPSULE BY MOUTH DAILY 30 capsule 8    SUMAtriptan (IMITREX) 50 MG tablet take 1 tablet by mouth once as needed for migraine 9 tablet 5    vitamin D (CHOLECALCIFEROL) 1000 UNIT TABS tablet Take three tabs a day (Patient taking differently: Take two tabs a day) 60 tablet 0    ferrous sulfate 325 (65 FE) MG tablet One PO three times a week. 90 tablet 1    Multiple Vitamins-Minerals (HAIR/SKIN/NAILS PO) Take 2 tablets by mouth daily      Ascorbic Acid (VITAMIN C) 500 MG CAPS Take 1 tablet by mouth daily.  Cyanocobalamin (B-12) 1000 MCG TBCR Take 1 tablet by mouth daily.  zoledronic acid (RECLAST) 5 MG/100ML SOLN Infuse 100 mLs intravenously once for 1 dose M81.0 100 mL 0     No current facility-administered medications for this visit. No orders of the defined types were placed in this encounter. Orders Placed This Encounter   Medications    amoxicillin-clavulanate (AUGMENTIN) 875-125 MG per tablet     Sig: Take 1 tablet by mouth 2 times daily     Dispense:  20 tablet     Refill:  0              Return if symptoms worsen or fail to improve.     Dr. Bethanie Sood      1/7/20  8:42 AM

## 2020-01-13 RX ORDER — TIZANIDINE 4 MG/1
TABLET ORAL
Qty: 30 TABLET | Refills: 1 | Status: SHIPPED | OUTPATIENT
Start: 2020-01-13 | End: 2020-03-12

## 2020-01-22 ENCOUNTER — TELEPHONE (OUTPATIENT)
Dept: FAMILY MEDICINE CLINIC | Age: 55
End: 2020-01-22

## 2020-01-23 NOTE — TELEPHONE ENCOUNTER
I saw the patient on December 19 and she was completely well. Then saw her the beginning of January of this year with the URI type complaints she has. Therefore not sure how she could have had this illness for several months. I am not sure what type of specialist she would like to see.   She can let me know and I can refer her or she can come back in to be seen in follow-up

## 2020-01-28 ENCOUNTER — OFFICE VISIT (OUTPATIENT)
Dept: FAMILY MEDICINE CLINIC | Age: 55
End: 2020-01-28
Payer: COMMERCIAL

## 2020-01-28 VITALS
DIASTOLIC BLOOD PRESSURE: 72 MMHG | SYSTOLIC BLOOD PRESSURE: 112 MMHG | WEIGHT: 293 LBS | TEMPERATURE: 98.8 F | OXYGEN SATURATION: 98 % | HEART RATE: 83 BPM | HEIGHT: 64 IN | RESPIRATION RATE: 16 BRPM | BODY MASS INDEX: 50.02 KG/M2

## 2020-01-28 PROCEDURE — 99213 OFFICE O/P EST LOW 20 MIN: CPT | Performed by: FAMILY MEDICINE

## 2020-01-28 RX ORDER — SULFAMETHOXAZOLE AND TRIMETHOPRIM 800; 160 MG/1; MG/1
1 TABLET ORAL 2 TIMES DAILY
Qty: 20 TABLET | Refills: 0 | Status: SHIPPED | OUTPATIENT
Start: 2020-01-28 | End: 2020-02-07

## 2020-01-28 NOTE — PROGRESS NOTES
Patient: Ana Harper    YOB: 1965    Date: 1/28/20    Chief Complaint   Patient presents with    Sinus Problem     She complains of sinus pain, pressure, headache and nasal congestion.  Cough     She complains of cough with clear to white sputum. Patient Active Problem List    Diagnosis Date Noted    S/P bariatric surgery 03/27/2018    Spondylosis of lumbar region without myelopathy or radiculopathy 12/21/2015    Migraine without aura and without status migrainosus, not intractable 08/20/2015    Depression 06/19/2013    DJD (degenerative joint disease), lumbar 03/06/2013    BMI 50.0-59.9, adult ([facility] 03/06/2013    Osteoporosis 02/20/2013    Osteoarthritis 10/01/2012    DJD (degenerative joint disease) of knee     B12 deficiency        Allergies   Allergen Reactions    Seasonal        Vitals:    01/28/20 0827   BP: 112/72   Site: Right Upper Arm   Position: Sitting   Cuff Size: Large Adult   Pulse: 83   Resp: 16   Temp: 98.8 °F (37.1 °C)   TempSrc: Oral   SpO2: 98%   Weight: (!) 311 lb (141.1 kg)   Height: 5' 4\" (1.626 m)      Body mass index is 53.38 kg/m². HPI She was treated for a URI and her cough and PND remain. No fever or ear or sore throat. Review of Systems    Constitutional: Negative for fatigue, fever and sweats. HEENT: Negative for eye discharge and vision loss. Negative for ear drainage, hearing loss and nasal drainage. Respiratory: positive for cough, negative for dyspnea and wheezing. Cardiovascular:  Negative for chest pain, claudication and irregular heartbeat/palpitations. Gastrointestinal: Negative for abdominal pain, nausea, constipation and diarrhea. Genitourinary: Negative for dysuria, patient postmenopausal.  Metabolic/Endocrine: Negative for cold intolerance, heat intolerance, polydipsia and polyphagia. No unintended weight loss or weight gain. Neuro/Psychiatric: Negative for gait disturbance.  Negative for psychiatric ferrous sulfate 325 (65 FE) MG tablet One PO three times a week. 90 tablet 1    Multiple Vitamins-Minerals (HAIR/SKIN/NAILS PO) Take 2 tablets by mouth daily      Ascorbic Acid (VITAMIN C) 500 MG CAPS Take 1 tablet by mouth daily.  Cyanocobalamin (B-12) 1000 MCG TBCR Take 1 tablet by mouth daily.  zoledronic acid (RECLAST) 5 MG/100ML SOLN Infuse 100 mLs intravenously once for 1 dose M81.0 (Patient not taking: Reported on 1/28/2020) 100 mL 0     No current facility-administered medications for this visit. No orders of the defined types were placed in this encounter. Orders Placed This Encounter   Medications    sulfamethoxazole-trimethoprim (BACTRIM DS) 800-160 MG per tablet     Sig: Take 1 tablet by mouth 2 times daily for 10 days     Dispense:  20 tablet     Refill:  0       If symptoms persist will send to ENT      Return if symptoms worsen or fail to improve.     Dr. Des Arambula      1/28/20  11:35 AM

## 2020-03-12 RX ORDER — TIZANIDINE 4 MG/1
TABLET ORAL
Qty: 30 TABLET | Refills: 0 | Status: SHIPPED | OUTPATIENT
Start: 2020-03-12 | End: 2020-04-15

## 2020-04-20 ENCOUNTER — PATIENT MESSAGE (OUTPATIENT)
Dept: FAMILY MEDICINE CLINIC | Age: 55
End: 2020-04-20

## 2020-04-20 NOTE — TELEPHONE ENCOUNTER
From: Lula Rg  To: Olivia Alcantar MD  Sent: 4/20/2020 7:24 AM EDT  Subject: Non-Urgent Medical Question    Hi Dr. Faviola Sheets, hope you and your staff are doing well. My sinuses are messed up. Any chance I can get back on my prescription cetirizine? I've been using Sherin pot with [employer] solution and vaporizer everyday/night. But I feel getting back on RX might help. Probably doesn't help that we are taking care of our daughter's 3 husky puppies and their dad,(OMG so crazy) while she is on her last week of pregnancy and they are cleaning up their apartment to move out by May 1st. Sorry, TMI. I think I just needed to vent a little. Anyway, please let me know about RX or any other suggestions. Thank you!  [facility]

## 2020-04-24 RX ORDER — CETIRIZINE HYDROCHLORIDE 10 MG/1
10 TABLET ORAL DAILY PRN
Qty: 90 TABLET | Refills: 1 | Status: SHIPPED | OUTPATIENT
Start: 2020-04-24 | End: 2020-10-31 | Stop reason: SDUPTHER

## 2020-06-18 ENCOUNTER — VIRTUAL VISIT (OUTPATIENT)
Dept: FAMILY MEDICINE CLINIC | Age: 55
End: 2020-06-18
Payer: COMMERCIAL

## 2020-06-18 PROCEDURE — 99214 OFFICE O/P EST MOD 30 MIN: CPT | Performed by: FAMILY MEDICINE

## 2020-06-18 RX ORDER — ZOLEDRONIC ACID 5 MG/100ML
5 INJECTION, SOLUTION INTRAVENOUS ONCE
Qty: 100 ML | Refills: 0 | Status: SHIPPED | OUTPATIENT
Start: 2020-06-18 | End: 2021-06-15 | Stop reason: ALTCHOICE

## 2020-06-18 NOTE — PROGRESS NOTES
Negative for gait disturbance. Negative for psychiatric symptoms. Dermatologic: Negative for pruritus and rash. Musculoskeletal: Negative for bone/joint symptoms. No numbness or tingling. No loss of function. Hematology: Negative for bleeding and easy bruising. Immunology:  Negative for environmental allergies and food allergies. Physical Exam    Patient seen on video and is alert, comfortable in no acute distress. The patient is comfortable and appropriate and oriented. Able to speak without difficulty. Assessment:   Diagnosis Orders   1. Chronic rhinitis  External Referral to ENT   2. Spondylolisthesis at L4-L5 level  Ambulatory referral to Neurosurgery  I am holding off on getting either EMG or an MRI until she is evaluated by the neurosurgeon if possible. If they feel this is not something that surgically is appropriate that we can follow-up on nonsurgical methods. 3. Other osteoporosis without current pathological fracture  zoledronic acid (RECLAST) 5 MG/100ML SOLN              Plan:  Current Outpatient Medications   Medication Sig Dispense Refill    zoledronic acid (RECLAST) 5 MG/100ML SOLN Infuse 100 mLs intravenously once for 1 dose M81.0 100 mL 0    cetirizine (ZYRTEC) 10 MG tablet Take 1 tablet by mouth daily as needed for Allergies TAKE ONE TABLET BY MOUTH EVERY DAY 90 tablet 1    tiZANidine (ZANAFLEX) 4 MG tablet TAKE 1 TABLET BY MOUTH 3 TIMES A DAY AS NEEDED FOR BACK SPASM 60 tablet 1    DULoxetine (CYMBALTA) 30 MG extended release capsule TAKE ONE CAPSULE BY MOUTH DAILY 30 capsule 8    SUMAtriptan (IMITREX) 50 MG tablet take 1 tablet by mouth once as needed for migraine 9 tablet 5    vitamin D (CHOLECALCIFEROL) 1000 UNIT TABS tablet Take three tabs a day (Patient taking differently: Take two tabs a day) 60 tablet 0    ferrous sulfate 325 (65 FE) MG tablet One PO three times a week.  90 tablet 1    Multiple Vitamins-Minerals (HAIR/SKIN/NAILS PO) Take 2 tablets by mouth daily

## 2020-06-22 RX ORDER — ZOLEDRONIC ACID 5 MG/100ML
5 INJECTION, SOLUTION INTRAVENOUS ONCE
Status: CANCELLED | OUTPATIENT
Start: 2020-06-22

## 2020-07-01 RX ORDER — DULOXETIN HYDROCHLORIDE 30 MG/1
CAPSULE, DELAYED RELEASE ORAL
Qty: 30 CAPSULE | Refills: 5 | Status: SHIPPED | OUTPATIENT
Start: 2020-07-01 | End: 2020-10-22 | Stop reason: SDUPTHER

## 2020-07-07 ENCOUNTER — HOSPITAL ENCOUNTER (OUTPATIENT)
Dept: INFUSION THERAPY | Age: 55
Setting detail: INFUSION SERIES
Discharge: HOME | End: 2020-07-07
Payer: COMMERCIAL

## 2020-07-07 DIAGNOSIS — M81.8 OTHER OSTEOPOROSIS WITHOUT CURRENT PATHOLOGICAL FRACTURE: Primary | ICD-10-CM

## 2020-07-07 PROCEDURE — 96365 THER/PROPH/DIAG IV INF INIT: CPT

## 2020-07-07 PROCEDURE — 6360000002 HC RX W HCPCS: Performed by: FAMILY MEDICINE

## 2020-07-07 RX ORDER — ZOLEDRONIC ACID 5 MG/100ML
5 INJECTION, SOLUTION INTRAVENOUS ONCE
Status: COMPLETED | OUTPATIENT
Start: 2020-07-07 | End: 2020-07-07

## 2020-07-07 RX ORDER — ZOLEDRONIC ACID 5 MG/100ML
5 INJECTION, SOLUTION INTRAVENOUS ONCE
Status: CANCELLED | OUTPATIENT
Start: 2020-07-07

## 2020-07-07 RX ADMIN — ZOLEDRONIC ACID 5 MG: 5 INJECTION, SOLUTION INTRAVENOUS at 10:25

## 2020-07-07 NOTE — PROGRESS NOTES
Infusion completed. Pt tolerated well. Pt declined   AVS.   No c/o or requests. All equipment used in the care for this patient has been cleaned.

## 2020-08-02 ENCOUNTER — HOSPITAL ENCOUNTER (OUTPATIENT)
Dept: GENERAL RADIOLOGY | Age: 55
Discharge: HOME | End: 2020-08-04
Payer: COMMERCIAL

## 2020-08-02 PROCEDURE — 72110 X-RAY EXAM L-2 SPINE 4/>VWS: CPT

## 2020-08-03 RX ORDER — TIZANIDINE 4 MG/1
TABLET ORAL
Qty: 60 TABLET | Refills: 0 | Status: SHIPPED | OUTPATIENT
Start: 2020-08-03 | End: 2020-09-28

## 2020-08-06 ENCOUNTER — HOSPITAL ENCOUNTER (OUTPATIENT)
Dept: PHYSICAL THERAPY | Age: 55
Setting detail: THERAPIES SERIES
Discharge: HOME | End: 2020-08-06
Payer: COMMERCIAL

## 2020-08-06 PROCEDURE — 97162 PT EVAL MOD COMPLEX 30 MIN: CPT

## 2020-08-06 ASSESSMENT — PAIN DESCRIPTION - LOCATION: LOCATION: BACK;HIP;LEG

## 2020-08-06 ASSESSMENT — PAIN SCALES - GENERAL: PAINLEVEL_OUTOF10: 6

## 2020-08-06 ASSESSMENT — PAIN DESCRIPTION - DESCRIPTORS: DESCRIPTORS: ACHING;TINGLING

## 2020-08-06 ASSESSMENT — PAIN DESCRIPTION - ORIENTATION: ORIENTATION: LEFT

## 2020-08-06 NOTE — PROGRESS NOTES
[address]  PHYSICAL THERAPY EVALUATION    Date: 2020  Patient Name: Lizett Fabian       MRN: [id number]   Account: [de-identified]   : 1965  (48 y.o.)   Gender: female   Referring Practitioner: Nette Armstrong MD                 Diagnosis: Spondylolisthesis of lumbar region  Treatment Diagnosis: low back pain, left hip pain  Additional Pertinent Hx: OA, chronic pain, sinusitis, hernia repair, bilateral knee replacements           Past Medical History:  has a past medical history of Arthritis, B12 deficiency, Bursitis, hip, Bursitis, hip, Chronic back pain, Depression, DJD (degenerative joint disease) of knee, H/O blood clots, Headache(784.0), Herpes simplex, History of depression, Neuropathy, Obesity, and Vitamin D deficiency. Past Surgical History:   has a past surgical history that includes  section; hernia repair; Small intestine surgery; Gastric bypass surgery (2001); Total knee arthroplasty (Right, 10/29/13); joint replacement (Bilateral, 10/29/13); and pr colonoscopy w/biopsy single/multiple (N/A, 5/10/2018). Vital Signs  Patient Currently in Pain: Yes   Pain Screening  Patient Currently in Pain: Yes  Pain Assessment  Pain Assessment: 0-10  Pain Level: 6  Pain Location: Back;Hip;Leg  Pain Orientation: Left  Pain Descriptors: Aching;Tingling                Lives With: Family  Type of Home: House  Home Layout: Two level; Able to Live on Main level with bedroom/bathroom  Home Access: Stairs to enter without rails  Entrance Stairs - Number of Steps: 1  ADL Assistance: Independent  Ambulation Assistance: Independent  Transfer Assistance: Independent        Subjective:  Subjective: Patient reports chronic back pain with reports of recent numbness/tingling in left lower LE which is constant. Increased pain with sitting too long, standing too long, bending, and twisting. Decreased pain with laying down. X-ray found GRADE 2 L4 SPONDYLOLISTHESIS.   Denies any changes in bowel or bladder control. Denies any paresthesia in saddle region. Comments: RTD 8/28/20    Objective:   Sensation  Overall Sensation Status: Impaired  Additional Comments: tingling/numbness in left lower leg        Ambulation 1  Surface: carpet  Device: No Device  Assistance: Independent  Quality of Gait: lateral sway, decrease stance on left LE  Gait Deviations: Decreased step length, Decreased step height  Distance: clinical distance in department        Transfers  Sit to Stand: Independent  Stand to sit: Independent    Strength RLE  R Hip Flexion: 4-/5  R Hip Extension: 4-/5  R Hip ABduction: 3+/5  R Hip Internal Rotation: 4/5  R Hip External Rotation: 4/5  R Knee Flexion: 4-/5  R Knee Extension: 5/5  R Ankle Dorsiflexion: 4+/5  Strength LLE  L Hip Flexion: 3/5  L Hip Extension: 4-/5  L Hip ABduction: 3-/5  L Hip Internal Rotation: 4-/5  L Hip External Rotation: 4-/5  L Knee Flexion: 4-/5  L Knee Extension: 5/5  L Ankle Dorsiflexion: 4+/5        Strength Other  Other: Decreased core strength based off functional mobility.     PROM RLE (degrees)  RLE General PROM: SLR: 75 deg     PROM LLE (degrees)  LLE General PROM: SLR: 65 deg           Spine  Lumbar: flexion 75% WFLs, bilateral SB 50% WFLs, ext 10 deg    Observation/Palpation  Posture: Fair(rounded shoulders)  Palpation: mild tightness in lumbar paraspinals  Observation: left LE longer than right  Bed mobility  Rolling to Left: Independent  Rolling to Right: Independent  Supine to Sit: Independent  Sit to Supine: Independent          Additional Measures  Other: Modified Oswestry:       Exercises:   Exercises  Exercise 1: TA isometric with breath 5s x 10  Exercise 2: NuStep or SciFit*  Exercise 3: DLS*  Exercise 4: bridging*  Exercise 5: SLR*  Exercise 6: clamshells*  Exercise 7: hip adduction with ball, hip abduction with Tband*  Exercise 8: TA walkouts*  Exercise 9: hamstring stretch*  Exercise 10: Pball core ex*  Exercise 20: HEP: TA isometric  Modalities:  Modalities  Moist heat: PRN*  Manual:  Manual therapy  Manual traction: gentle traction*  Soft Tissue Mobalization: lumbar*  *Indicates exercise,modality, or manual techniques to be initiated when appropriate  Assessment: Body structures, Functions, Activity limitations: Decreased ROM, Decreased endurance, Decreased sensation, Increased pain, Decreased posture, Decreased strength  Assessment: Patient reports chronic back pain that has progressed, reporting numbness/tingling in left LE. Upon PT evaluation, patient demonstrates decreased LE strength and core strength with impaired endurance. Further physical therapy recommended for strengthening and ROM for overall quality of life. Prognosis: Good  Discharge Recommendations: Continue to assess pending progress        Decision Making: Medium Complexity  History: OA, chronic pain, sinusitis, hernia repair, bilateral knee replacements  Exam: decreased LE strength, decreased endurance, decreased core strength, decreased lumbar ROM, impaired posture  Clinical Presentation: evolving        Plan  Frequency/Duration:  Plan  Times per week: 2  Plan weeks: 6  Current Treatment Recommendations: Strengthening, ROM, Endurance Training, Neuromuscular Re-education, Manual Therapy - Soft Tissue Mobilization, Manual Therapy - Joint Manipulation, Home Exercise Program, Safety Education & Training, Aquatics, Modalities  Plan Comment: transfer care to [facility]         Patient Education  New Education Provided: PT Education: Goals;PT Role;Plan of Care;Home Exercise Program    POST-PAIN     Pain Rating (0-10 pain scale):  6 /10  Location and pain description same as pre-treatment unless indicated. Action: [] NA  [] Call Physician  [x] Perform HEP  [x] Meds as prescribed    Evaluation and patient rights have been reviewed and patient agrees with plan of care.   Yes  [x]  No  []   Explain:       Lester Fall Risk Assessment  Risk Factor Scale  Score   History

## 2020-08-06 NOTE — PLAN OF CARE
[address]     Ph: [phone]  Fax: [phone]    [] Certification  [] Recertification [x]  Plan of Care  [] Progress Note [] Discharge      To: Kapil Garcia MD      From:  Leena Olvera, MER  Patient: Douglas Batres     : 1965  Diagnosis: Spondylolisthesis of lumbar region     Date: 2020  Treatment Diagnosis: low back pain, left hip pain       Progress Report Period from:  2020  to 2020    Total # of Visits to Date: 1   No Show: 0    Canceled Appointment: 0     OBJECTIVE:   Short Term Goals=Long term goals    Long Term Goals - Time Frame for Long term goals : 6 weeks  Goals Current/ Discharge status Met   Long term goal 1: Patient will report </=3/10 pain in low back and left hip with functional activities. Patient reports 6/10 pain in low back and left hip. [] yes  [x] no   Long term goal 2: Patient will increase painfree lumbar ROM to 75% WFLs for improved functional tolerance. PROM RLE (degrees)  RLE General PROM: SLR: 75 deg     PROM LLE (degrees)  LLE General PROM: SLR: 65 deg              Spine  Lumbar: flexion 75% WFLs, bilateral SB 50% WFLs, ext 10 deg    [] yes  [x] no   Long term goal 3: Patient will increase strength in bilateral LEs >/= 4+/5 for improved functional tolerance. Strength RLE  R Hip Flexion: 4-/5  R Hip Extension: 4-/5  R Hip ABduction: 3+/5  R Hip Internal Rotation: 4/5  R Hip External Rotation: 4/5  R Knee Flexion: 4-/5  R Knee Extension: 5/5  R Ankle Dorsiflexion: 4+/5  Strength LLE  L Hip Flexion: 3/5  L Hip Extension: 4-/5  L Hip ABduction: 3-/5  L Hip Internal Rotation: 4-/5  L Hip External Rotation: 4-/5  L Knee Flexion: 4-/5  L Knee Extension: 5/5  L Ankle Dorsiflexion: 4+/5        Strength Other  Other: Decreased core strength based off functional mobility.    [] yes  [x] no   Long term goal 4: Modified Oswestry </= 21/50 to demonstrate functional improvements. Modified Oswestry: 27/50 [] yes  [x] no   Long term goal 5: Patient will be independent with HEP. Patient issued HEP. [] yes  [x] no        Body structures, Functions, Activity limitations: Decreased ROM, Decreased endurance, Decreased sensation, Increased pain, Decreased posture, Decreased strength  Assessment: Patient reports chronic back pain that has progressed, reporting numbness/tingling in left LE. Upon PT evaluation, patient demonstrates decreased LE strength and core strength with impaired endurance. Further physical therapy recommended for strengthening and ROM for overall quality of life. Prognosis: Good  Discharge Recommendations: Continue to assess pending progress      PT Education: Goals;PT Role;Plan of Care;Home Exercise Program    PLAN: [x] Evaluate and Treat  Frequency/Duration:  Plan  Times per week: 2  Plan weeks: 6  Current Treatment Recommendations: Strengthening, ROM, Endurance Training, Neuromuscular Re-education, Manual Therapy - Soft Tissue Mobilization, Manual Therapy - Joint Manipulation, Home Exercise Program, Safety Education & Training, Aquatics, Modalities  Plan Comment: transfer care to [facility] PT     Precautions:                            Patient Status:[x] Continue/ Initiate plan of Care    [] Discharge PT. Recommend pt continue with HEP. [] Additional visits requested, Please re-certify for additional visits:          Signature: Electronically signed by Jacob Elliott PT on 8/6/20 at 10:57 AM EDT      If you have any questions or concerns, please don't hesitate to call. Thank you for your referral.    I have reviewed this plan of care and certify a need for medically necessary rehabilitation services.     Physician Signature:__________________________________________________________  Date:  Please sign and return

## 2020-08-13 ENCOUNTER — TELEPHONE (OUTPATIENT)
Dept: FAMILY MEDICINE CLINIC | Age: 55
End: 2020-08-13

## 2020-08-13 ENCOUNTER — HOSPITAL ENCOUNTER (OUTPATIENT)
Dept: PHYSICAL THERAPY | Age: 55
Setting detail: THERAPIES SERIES
Discharge: HOME | End: 2020-08-13
Payer: COMMERCIAL

## 2020-08-13 PROCEDURE — 97110 THERAPEUTIC EXERCISES: CPT

## 2020-08-13 PROCEDURE — 97140 MANUAL THERAPY 1/> REGIONS: CPT

## 2020-08-13 ASSESSMENT — PAIN DESCRIPTION - DESCRIPTORS: DESCRIPTORS: TINGLING

## 2020-08-13 ASSESSMENT — PAIN SCALES - GENERAL: PAINLEVEL_OUTOF10: 2

## 2020-08-13 ASSESSMENT — PAIN DESCRIPTION - LOCATION: LOCATION: BACK

## 2020-08-13 ASSESSMENT — PAIN DESCRIPTION - ORIENTATION: ORIENTATION: LEFT

## 2020-08-13 ASSESSMENT — PAIN DESCRIPTION - PAIN TYPE: TYPE: CHRONIC PAIN

## 2020-08-13 NOTE — PROGRESS NOTES
RLE.Pt reports slight decrease in tingling Lt FOOT after gentle distraction. Tolerated session well overall with min c/o tingling during session. Treatment Diagnosis: low back pain, left hip pain  Prognosis: Good       Goals:       Long term goals  Time Frame for Long term goals : 6 weeks  Long term goal 1: Patient will report </=3/10 pain in low back and left hip with functional activities. Long term goal 2: Patient will increase painfree lumbar ROM to 75% WFLs for improved functional tolerance. Long term goal 3: Patient will increase strength in bilateral LEs >/= 4+/5 for improved functional tolerance. Long term goal 4: Modified Oswestry </= 21/50 to demonstrate functional improvements. Long term goal 5: Patient will be independent with HEP. Progress toward goals:strength  POST-PAIN       Pain Rating (0-10 pain scale):   1/10   Location and pain description same as pre-treatment unless indicated. Action: [x] NA   [] Perform HEP  [] Meds as prescribed  [] Modalities as prescribed   [] Call Physician     Frequency/Duration:  Plan  Times per week: 2  Plan weeks: 6  Current Treatment Recommendations: Strengthening, ROM, Endurance Training, Neuromuscular Re-education, Manual Therapy - Soft Tissue Mobilization, Manual Therapy - Joint Manipulation, Home Exercise Program, Safety Education & Training, Aquatics, Modalities  Plan Comment: transfer care to [facility]     Pt to continue current HEP. See objective section for any therapeutic exercise changes, additions or modifications this date.          PT Individual Minutes  Time In: 6713  Time Out: 0840  Minutes: 41     Procedure Minutes:0     Timed Activity Minutes Units   Ther Ex 33 2   Manual  8 1       Signature:  Electronically signed by Sandy Motley PTA on 8/13/20 at 11:25 AM EDT

## 2020-08-18 ENCOUNTER — HOSPITAL ENCOUNTER (OUTPATIENT)
Dept: PHYSICAL THERAPY | Age: 55
Setting detail: THERAPIES SERIES
Discharge: HOME | End: 2020-08-18
Payer: COMMERCIAL

## 2020-08-21 ENCOUNTER — HOSPITAL ENCOUNTER (OUTPATIENT)
Dept: PHYSICAL THERAPY | Age: 55
Setting detail: THERAPIES SERIES
Discharge: HOME | End: 2020-08-21
Payer: COMMERCIAL

## 2020-08-21 PROCEDURE — 97110 THERAPEUTIC EXERCISES: CPT

## 2020-08-21 PROCEDURE — 97140 MANUAL THERAPY 1/> REGIONS: CPT

## 2020-08-21 ASSESSMENT — PAIN DESCRIPTION - LOCATION: LOCATION: BACK

## 2020-08-21 ASSESSMENT — PAIN SCALES - GENERAL: PAINLEVEL_OUTOF10: 7

## 2020-08-21 ASSESSMENT — PAIN DESCRIPTION - PAIN TYPE: TYPE: CHRONIC PAIN

## 2020-08-21 ASSESSMENT — PAIN DESCRIPTION - ORIENTATION: ORIENTATION: LEFT

## 2020-08-21 ASSESSMENT — PAIN DESCRIPTION - DESCRIPTORS: DESCRIPTORS: ACHING

## 2020-08-21 NOTE — PROGRESS NOTES
[address] [address]  Outpatient Physical Therapy    Treatment Note        Date: 2020  Patient: Daly Youngblood  : 1965  ACCT #: [de-identified]  Referring Practitioner: Megan Mcdaniel MD  Diagnosis: Spondylolisthesis of lumbar region    Visit Information:  PT Visit Information  PT Insurance Information: BCBS  Total # of Visits to Date: 3  No Show: 0  Canceled Appointment: 0  Progress Note Counter:     Subjective: Pt reports 7/10 pain this morning after her back started flaring up last night, pt states she's unsure why it flared up. Pt states despite inc LBP, the rad sx's are \"much less. \" Pt reports in soreness after LV. Comments: RTD 20  HEP Compliance:  [x] Good [] Fair [] Poor [] Reports not doing due to:    Vital Signs  Patient Currently in Pain: Yes   Pain Screening  Patient Currently in Pain: Yes  Pain Assessment  Pain Assessment: 0-10  Pain Level: 7  Pain Type: Chronic pain  Pain Location: Back  Pain Orientation: Left  Pain Descriptors: Aching    OBJECTIVE:   Exercises  Exercise 1: TA isometric with breath 5s x 15  Exercise 2: SciFit( preference) x 5' L2  Exercise 3: DLS (march and contralateral UE/LE) x 15 ea  Exercise 4: bridging;  5\" x 10  Exercise 5: TA SLR x12 b/l  Exercise 7: hip adduction with ball 5''x10, hip abduction with Tband X10, YTB  Exercise 9: HS seated w/ 6'' step 3x30'' b/l - VC's for postural awareness  Exercise 20: HEP: HS stretch    Strength: [x] NT  [] MMT completed:    ROM: [x] NT  [] ROM measurements:     Manual:   Manual therapy  Soft Tissue Mobalization: STM in R S/L to lumbar parspinals and L piriformis x8 min    Modalities:  Modalities  Other: pt declined, reports MHP/CP \"doesn't seem to help. \"     *Indicates exercise, modality, or manual techniques to be initiated when appropriate    Assessment:    Body structures, Functions, Activity limitations: Decreased ROM, Decreased endurance, Decreased sensation, Increased pain, Decreased posture, Decreased strength  Assessment: Cont w/ tx per POC for inc strength and core stability. Pt requires min VC'ing for postural awareness w/ HS stretch w/ good carryover. Pt reports min inc rad sx's w/ L SLR that dec at rest. Held LE pulls d/t inc rad sx's after LV. Pt responding well to STM to lumbar spine. Treatment Diagnosis: low back pain, left hip pain  Prognosis: Good     Goals:  Long term goals  Time Frame for Long term goals : 6 weeks  Long term goal 1: Patient will report </=3/10 pain in low back and left hip with functional activities. Long term goal 2: Patient will increase painfree lumbar ROM to 75% WFLs for improved functional tolerance. Long term goal 3: Patient will increase strength in bilateral LEs >/= 4+/5 for improved functional tolerance. Long term goal 4: Modified Oswestry </= 21/50 to demonstrate functional improvements. Long term goal 5: Patient will be independent with HEP. Progress toward goals:inc strength, rom, dec pain    POST-PAIN       Pain Rating (0-10 pain scale):  4 /10   Location and pain description same as pre-treatment unless indicated. Action: [] NA   [x] Perform HEP  [] Meds as prescribed  [] Modalities as prescribed   [] Call Physician     Frequency/Duration:  Plan  Times per week: 2  Plan weeks: 6  Current Treatment Recommendations: Strengthening, ROM, Endurance Training, Neuromuscular Re-education, Manual Therapy - Soft Tissue Mobilization, Manual Therapy - Joint Manipulation, Home Exercise Program, Safety Education & Training, Aquatics, Modalities  Plan Comment: transfer care to [facility]     Pt to continue current HEP. See objective section for any therapeutic exercise changes, additions or modifications this date.     PT Individual Minutes  Time In: 0800  Time Out: 0840  Minutes: 40  Timed Code Treatment Minutes: 40 Minutes  Procedure Minutes: 0     Timed Activity Minutes Units   Ther Ex 32 2   Manual  8 1       Signature:  Electronically signed by Dorina Oropeza PTA on 8/21/20 at 8:10 AM EDT

## 2020-08-26 ENCOUNTER — HOSPITAL ENCOUNTER (OUTPATIENT)
Dept: PHYSICAL THERAPY | Age: 55
Setting detail: THERAPIES SERIES
Discharge: HOME | End: 2020-08-26
Payer: COMMERCIAL

## 2020-08-26 PROCEDURE — 97140 MANUAL THERAPY 1/> REGIONS: CPT

## 2020-08-26 PROCEDURE — 97110 THERAPEUTIC EXERCISES: CPT

## 2020-08-26 ASSESSMENT — PAIN DESCRIPTION - DESCRIPTORS: DESCRIPTORS: ACHING

## 2020-08-26 ASSESSMENT — PAIN DESCRIPTION - PAIN TYPE: TYPE: CHRONIC PAIN

## 2020-08-26 ASSESSMENT — PAIN DESCRIPTION - LOCATION: LOCATION: BACK

## 2020-08-26 ASSESSMENT — PAIN SCALES - GENERAL: PAINLEVEL_OUTOF10: 7

## 2020-08-26 ASSESSMENT — PAIN DESCRIPTION - ORIENTATION: ORIENTATION: LEFT

## 2020-08-26 NOTE — PROGRESS NOTES
[address] [address]  Outpatient Physical Therapy    Treatment Note        Date: 2020  Patient: Douglas Batres  : 1965  ACCT #: [de-identified]  Referring Practitioner: Kapil Garcia MD  Diagnosis: Spondylolisthesis of lumbar region    Visit Information:  PT Visit Information  PT Insurance Information: BCBS  Total # of Visits to Date: 4  No Show: 0  Canceled Appointment: 0  Progress Note Counter:     Subjective: Pt reports achiness in LB, hips, and ankles today, \"probably d/t the weather (rain). \" Pt reports she was sore for a few days after LV. Pt reports achiness down the lateral portion of LLE. Pt also states she had some pain on R side today after \"twisting a little. \"  Comments: RTD 20  HEP Compliance:  [x] Good [] Fair [] Poor [] Reports not doing due to:    Vital Signs  Patient Currently in Pain: Yes   Pain Screening  Patient Currently in Pain: Yes  Pain Assessment  Pain Assessment: 0-10  Pain Level: 7  Pain Type: Chronic pain  Pain Location: Back  Pain Orientation: Left  Pain Descriptors: Aching    OBJECTIVE:   Exercises  Exercise 2: SciFit( preference) x 5' L2.2  Exercise 3: DLS (march and contralateral UE/LE) x 15 ea  Exercise 4: bridging;  5\" x 10  Exercise 5: TA SLR x12 b/l  Exercise 6: clamshells x 12  Exercise 7: hip adduction with ball 5''x12, hip abduction with Tband X10, YTB  Exercise 9: HS seated w/ 6'' step 3x30'' b/l  Exercise 20: HEP cont current    Strength: [x] NT  [] MMT completed:    ROM: [] NT  [x] ROM measurements:  Spine  Lumbar: flexion 75% WFLs, bilateral SB 50% WFLs (inc pain), ext 10 deg     Manual:   Manual therapy  Soft Tissue Mobalization: STM in R S/L to lumbar parspinals and L piriformis x10 min    *Indicates exercise, modality, or manual techniques to be initiated when appropriate    Assessment:    Body structures, Functions, Activity limitations: Decreased ROM, Decreased endurance, Decreased sensation, Increased pain, Decreased posture, Decreased strength  Assessment: Held most progressions this date d/t inc achiness and soreness after LV lasting > 1 day. Pt demo's improved postural awareness w/ HS stretches this date not requiring any verbal cues. Pt cont to respond well to [facility] w/ tennis ball. Pt w/o change in lumbar ROM measures since eval.  Treatment Diagnosis: low back pain, left hip pain  Prognosis: Good     Goals:  Long term goals  Time Frame for Long term goals : 6 weeks  Long term goal 1: Patient will report </=3/10 pain in low back and left hip with functional activities. Long term goal 2: Patient will increase painfree lumbar ROM to 75% WFLs for improved functional tolerance. Long term goal 3: Patient will increase strength in bilateral LEs >/= 4+/5 for improved functional tolerance. Long term goal 4: Modified Oswestry </= 21/50 to demonstrate functional improvements. Long term goal 5: Patient will be independent with HEP. Progress toward goals: inc strength, rom, dec pain    POST-PAIN       Pain Rating (0-10 pain scale):  5 /10   Location and pain description same as pre-treatment unless indicated. Action: [] NA   [x] Perform HEP  [] Meds as prescribed  [] Modalities as prescribed   [] Call Physician     Frequency/Duration:  Plan  Times per week: 2  Plan weeks: 6  Current Treatment Recommendations: Strengthening, ROM, Endurance Training, Neuromuscular Re-education, Manual Therapy - Soft Tissue Mobilization, Manual Therapy - Joint Manipulation, Home Exercise Program, Safety Education & Training, Aquatics, Modalities  Plan Comment: transfer care to [facility] PT     Pt to continue current HEP. See objective section for any therapeutic exercise changes, additions or modifications this date.     PT Individual Minutes  Time In: 0840  Time Out: 5018  Minutes: 38  Timed Code Treatment Minutes: 38 Minutes  Procedure Minutes: 0      Timed Activity Minutes Units   Ther Ex 28 2   Manual  10 1       Signature:  Electronically signed by Pardeep Devine PTA on 8/26/20 at 8:40 AM EDT

## 2020-08-28 ENCOUNTER — HOSPITAL ENCOUNTER (OUTPATIENT)
Dept: PHYSICAL THERAPY | Age: 55
Setting detail: THERAPIES SERIES
Discharge: HOME | End: 2020-08-28
Payer: COMMERCIAL

## 2020-08-28 PROCEDURE — 97140 MANUAL THERAPY 1/> REGIONS: CPT

## 2020-08-28 PROCEDURE — 97110 THERAPEUTIC EXERCISES: CPT

## 2020-08-28 ASSESSMENT — PAIN SCALES - GENERAL: PAINLEVEL_OUTOF10: 7

## 2020-08-28 ASSESSMENT — PAIN DESCRIPTION - PAIN TYPE: TYPE: CHRONIC PAIN

## 2020-08-28 ASSESSMENT — PAIN DESCRIPTION - LOCATION: LOCATION: BACK

## 2020-08-28 ASSESSMENT — PAIN DESCRIPTION - ORIENTATION: ORIENTATION: LEFT

## 2020-08-28 ASSESSMENT — PAIN DESCRIPTION - DESCRIPTORS: DESCRIPTORS: ACHING

## 2020-08-28 NOTE — DISCHARGE SUMMARY
[address] [facility]     Ph: [phone]  Fax: [phone]    [] Certification  [] Recertification []  Plan of Care  [] Progress Note [x] Discharge      To:  Referring Practitioner: Deisi Michaud MD      From:  Carroll Bullard PT, DPT  Patient: Rosemary Goff     : 1965  Diagnosis: Spondylolisthesis of lumbar region     Date: 2020  Treatment Diagnosis: low back pain, left hip pain       Progress Report Period from:  2020  to 2020    Total # of Visits to Date: 5   No Show: 0    Canceled Appointment: 0     OBJECTIVE:   Long Term Goals - Time Frame for Long term goals : 6 weeks  Goals Current/ Discharge status Met   Long term goal 1: Patient will report </=3/10 pain in low back and left hip with functional activities. Pt reports 7/10 pain [] yes  [x] no   Long term goal 2: Patient will increase painfree lumbar ROM to 75% WFLs for improved functional tolerance. Spine (tested 2020)  Lumbar: flexion 75% WFLs, bilateral SB 50% WFLs (inc pain), ext 10 deg    [] yes  [x] no   Long term goal 3: Patient will increase strength in bilateral LEs >/= 4+/5 for improved functional tolerance. NT d/t unexpected D/C [] yes  [] no   Long term goal 4: Modified Oswestry </= 21/50 to demonstrate functional improvements. NT d/t unexpected D/C [] yes  [] no   Long term goal 5: Patient will be independent with HEP. indep w/ current [x] yes  [] no        Body structures, Functions, Activity limitations: Decreased ROM, Decreased endurance, Decreased sensation, Increased pain, Decreased posture, Decreased strength    Assessment: All goals not formally tested d/t unexpected D/C d/t high co-pay. Pt requesting to discontinue PT POC with continuation of HEP.      Prognosis: Good  Discharge Recommendations: Continue to assess pending progress       PLAN:   Frequency/Duration:  Plan  Times per week: 2  Plan weeks: 6  Current Treatment Recommendations: Strengthening, ROM, Endurance Training, Neuromuscular Re-education, Manual Therapy - Soft Tissue Mobilization, Manual Therapy - Joint Manipulation, Home Exercise Program, Safety Education & Training, Aquatics, Modalities  Plan Comment: transfer care to [facility]                     Patient Status:[] Continue/ Initiate plan of Care    [x] Discharge PT. Recommend pt continue with HEP. [] Additional visits requested, Please re-certify for additional visits:          Signature: Objective Info Written by:  Electronically signed by Dorina Oropeza PTA on 8/28/20 at 4:06 PM EDT  Signature: Electronically signed by TESFAYE CANELA PT on 9/8/2020 at 11:14 AM      If you have any questions or concerns, please don't hesitate to call.   Thank you for your referral.

## 2020-08-28 NOTE — PROGRESS NOTES
[address] [address]  Outpatient Physical Therapy    Treatment Note        Date: 2020  Patient: Alexis Hopikns  : 1965  ACCT #: [de-identified]  Referring Practitioner: Princess Hawkins MD  Diagnosis: Spondylolisthesis of lumbar region    Visit Information:  PT Visit Information  PT Insurance Information: BCBS  Total # of Visits to Date: 5  No Show: 0  Canceled Appointment: 0  Progress Note Counter:     Subjective: Pt reports \"things are about the same. \" Pt states she was much less sore after LV compared to the week before. Comments: RTD 20  HEP Compliance:  [x] Good [] Fair [] Poor [] Reports not doing due to:    Vital Signs  Patient Currently in Pain: Yes   Pain Screening  Patient Currently in Pain: Yes  Pain Assessment  Pain Assessment: 0-10  Pain Level: 7  Pain Type: Chronic pain  Pain Location: Back  Pain Orientation: Left  Pain Descriptors: Aching    OBJECTIVE:   Exercises  Exercise 1: LTR 5''x10  Exercise 2: SciFit( preference) x 5' L2.5  Exercise 3: DLS (march and contralateral UE/LE) x 18 ea  Exercise 4: bridging;  5\" x 10  Exercise 5: TA SLR x15 b/l  Exercise 6: clamshells x 15  Exercise 7: hip adduction with ball 5''x15, hip abduction with Tband X15, RTB  Exercise 8: TA HS curls w/ pball 5''x10  Exercise 9: HS seated w/ 6'' step 3x30'' b/l    Strength: [x] NT  [] MMT completed:     ROM: [x] NT  [] ROM measurements:     Manual:   Manual therapy  Soft Tissue Mobalization: STM in R S/L to lumbar parspinals and L piriformis x8 min    *Indicates exercise, modality, or manual techniques to be initiated when appropriate    Assessment: Body structures, Functions, Activity limitations: Decreased ROM, Decreased endurance, Decreased sensation, Increased pain, Decreased posture, Decreased strength  Assessment: initiated LTR and TA HS curls this date for improved lumbar ROM and LE strength. Also progressed reps of select ex's w/o complaint by pt.  Pt reportig dec in pain to 0/10 throughout session

## 2020-08-28 NOTE — PROGRESS NOTES
[address]       Physical Therapy  Cancellation/No-show Note  Patient Name:  Heike Cruz  :  1965   Date:  2020          Visit Information:  PT Visit Information  PT Insurance Information: BCBS  Total # of Visits to Date: 5  No Show: 0  Canceled Appointment: 1  Progress Note Counter:  CX remaining appts    For today's appointment patient:  [x]  Cancelled  []  Rescheduled appointment  []  No-show   []  Called pt to remind of next appointment     Reason given by patient:  []  Patient ill  []  Conflicting appointment  []  No transportation    []  Conflict with work  []  No reason given  []  Other:       Comments:   Pt called and requested to cancel remaining appts d/t co-pay and will indep complete HEP to self-manage sx's.     Signature: Electronically signed by Burke Gandhi PTA on 20 at 4:03 PM EDT

## 2020-09-01 ENCOUNTER — HOSPITAL ENCOUNTER (OUTPATIENT)
Dept: PHYSICAL THERAPY | Age: 55
Setting detail: THERAPIES SERIES
Discharge: HOME | End: 2020-09-01
Payer: COMMERCIAL

## 2020-09-28 RX ORDER — TIZANIDINE 4 MG/1
TABLET ORAL
Qty: 60 TABLET | Refills: 0 | Status: SHIPPED | OUTPATIENT
Start: 2020-09-28 | End: 2020-12-11 | Stop reason: SDUPTHER

## 2020-10-02 RX ORDER — GABAPENTIN 100 MG/1
100 CAPSULE ORAL 3 TIMES DAILY
Qty: 270 CAPSULE | Refills: 0 | Status: SHIPPED | OUTPATIENT
Start: 2020-10-02 | End: 2021-01-04 | Stop reason: SDUPTHER

## 2020-10-31 RX ORDER — CETIRIZINE HYDROCHLORIDE 10 MG/1
TABLET ORAL
Qty: 90 TABLET | Refills: 1 | Status: SHIPPED | OUTPATIENT
Start: 2020-10-31 | End: 2021-05-02

## 2020-12-11 RX ORDER — TIZANIDINE 4 MG/1
TABLET ORAL
Qty: 60 TABLET | Refills: 0 | Status: SHIPPED | OUTPATIENT
Start: 2020-12-11 | End: 2021-02-01

## 2021-02-01 RX ORDER — TIZANIDINE 4 MG/1
TABLET ORAL
Qty: 60 TABLET | Refills: 0 | Status: SHIPPED | OUTPATIENT
Start: 2021-02-01 | End: 2021-04-08

## 2021-04-08 RX ORDER — TIZANIDINE 4 MG/1
TABLET ORAL
Qty: 60 TABLET | Refills: 0 | Status: SHIPPED | OUTPATIENT
Start: 2021-04-08 | End: 2021-06-15 | Stop reason: SDUPTHER

## 2021-05-02 RX ORDER — CETIRIZINE HYDROCHLORIDE 10 MG/1
TABLET ORAL
Qty: 90 TABLET | Refills: 0 | Status: SHIPPED | OUTPATIENT
Start: 2021-05-02 | End: 2021-06-15 | Stop reason: SDUPTHER

## 2021-05-02 NOTE — TELEPHONE ENCOUNTER
Rx requested:  Requested Prescriptions     Pending Prescriptions Disp Refills    cetirizine (ZYRTEC) 10 MG tablet [Pharmacy Med Name: Cetirizine HCl Oral Tablet 10 MG] 90 tablet 0     Sig: TAKE ONE TABLET BY MOUTH EVERY DAY AS NEEDED FOR ALLERGIES       Last Office Visit:   6/18/2020      Next Visit Date:  Future Appointments   Date Time Provider Brenda Ponce   6/2/2021 10:45 AM Binu Urias MD [facility]

## 2021-06-02 ENCOUNTER — TELEPHONE (OUTPATIENT)
Dept: NEUROSURGERY | Age: 56
End: 2021-06-02

## 2021-06-02 NOTE — TELEPHONE ENCOUNTER
Lm letting pt know we need to reschedule appt with Dr. Belem Damon today. Dr. Belem Damon will be out of the office today, okay to r/s to Monday June 7 in [facility].

## 2021-06-07 ENCOUNTER — OFFICE VISIT (OUTPATIENT)
Dept: NEUROSURGERY | Age: 56
End: 2021-06-07
Payer: COMMERCIAL

## 2021-06-07 VITALS
TEMPERATURE: 97.6 F | BODY MASS INDEX: 50.02 KG/M2 | WEIGHT: 293 LBS | DIASTOLIC BLOOD PRESSURE: 84 MMHG | SYSTOLIC BLOOD PRESSURE: 143 MMHG | HEIGHT: 64 IN

## 2021-06-07 DIAGNOSIS — M47.816 LUMBAR SPONDYLOSIS: Primary | ICD-10-CM

## 2021-06-07 DIAGNOSIS — M54.16 LUMBAR RADICULOPATHY: ICD-10-CM

## 2021-06-07 DIAGNOSIS — M43.16 SPONDYLOLISTHESIS OF LUMBAR REGION: ICD-10-CM

## 2021-06-07 PROCEDURE — 99213 OFFICE O/P EST LOW 20 MIN: CPT | Performed by: NEUROLOGICAL SURGERY

## 2021-06-07 ASSESSMENT — ENCOUNTER SYMPTOMS
CONSTIPATION: 0
DIARRHEA: 0
SHORTNESS OF BREATH: 0
NAUSEA: 0
BACK PAIN: 1

## 2021-06-07 NOTE — PROGRESS NOTES
[facility] ([facility]) Physicians  Neurosurgery and Pain [facility]  [address] [place] [address]., [address] [facility] [place] [address]: [phone]  F: [phone]       Elvi Souza  (1965)    2021    Referred by No ref.  provider found    Subjective:     Elvi Souza is 50 y.o. female who complains today of:    Chief Complaint   Patient presents with    Back Pain     HPI    Allergies:  Seasonal    Past Medical History:   Diagnosis Date    Arthritis     B12 deficiency     Bursitis, hip     Bursitis, hip     Chronic back pain     Depression     DJD (degenerative joint disease) of knee     L spine, hips, and knees    H/O blood clots 3/7/13    in lungs-legs where clear    Headache(784.0)     Herpes simplex     History of depression     Neuropathy     Vit B12 deff and DM    Obesity     dispite gastric bypass    Vitamin D deficiency      Past Surgical History:   Procedure Laterality Date     SECTION      GASTRIC BYPASS SURGERY  2001    HERNIA REPAIR      JOINT REPLACEMENT Bilateral 10/29/13    DR Shannon Botello    [place] COLONOSCOPY W/BIOPSY SINGLE/MULTIPLE N/A 5/10/2018    COLONOSCOPY, (12:00) performed by Thierry Helton MD at [address] [facility] Right 10/29/13     Family History   Problem Relation Age of Onset    Dementia Father     Heart Disease Father     Parkinsonism Father     Arthritis Father     Hypertension Mother     High Blood Pressure Mother     Arthritis Mother     Thyroid Cancer Sister      Social History     Socioeconomic History    Marital status:      Spouse name: Not on file    Number of children: Not on file    Years of education: Not on file    Highest education level: Not on file   Occupational History    Not on file   Tobacco Use    Smoking status: Never Smoker    Smokeless tobacco: Never Used   Substance and Sexual Activity    Alcohol use: No     Alcohol/week: 0.0 standard drinks  Drug use: No    Sexual activity: Never   Other Topics Concern    Not on file   Social History Narrative    Not on file     Social Determinants of Health     Financial Resource Strain:     Difficulty of Paying Living Expenses:    Food Insecurity:     Worried About Running Out of Food in the Last Year:     [address] in the Last Year:    Transportation Needs:     Lack of Transportation (Medical):      Lack of Transportation (Non-Medical):    Physical Activity:     Days of Exercise per Week:     Minutes of Exercise per Session:    Stress:     Feeling of Stress :    Social Connections:     Frequency of Communication with Friends and Family:     Frequency of Social Gatherings with Friends and Family:     Attends Hindu Services:     Active Member of Clubs or Organizations:     Attends Club or Organization Meetings:     Marital Status:    Intimate Partner Violence:     Fear of Current or Ex-Partner:     Emotionally Abused:     Physically Abused:     Sexually Abused:        Current Outpatient Medications on File Prior to Visit   Medication Sig Dispense Refill    tiZANidine (ZANAFLEX) 4 MG tablet TAKE ONE TABLET BY MOUTH THREE TIMES A DAY AS NEEDED for back spasms (Patient taking differently: TAKE ONE TABLET BY MOUTH ONCE PER DAY) 60 tablet 0    cetirizine (ZYRTEC) 10 MG tablet TAKE ONE TABLET BY MOUTH EVERY DAY AS NEEDED FOR ALLERGIES  90 tablet 0    gabapentin (NEURONTIN) 100 MG capsule TAKE ONE CAPSULE BY MOUTH THREE TIMES A  capsule 0    DULoxetine (CYMBALTA) 30 MG extended release capsule TAKE ONE CAPSULE BY MOUTH DAILY 90 capsule 3    SUMAtriptan (IMITREX) 50 MG tablet take 1 tablet by mouth once as needed for migraine 9 tablet 5    zoledronic acid (RECLAST) 5 MG/100ML SOLN Infuse 100 mLs intravenously once for 1 dose M81.0 100 mL 0    vitamin D (CHOLECALCIFEROL) 1000 UNIT TABS tablet Take three tabs a day (Patient taking differently: Take two tabs a day) 60 tablet 0    ferrous sulfate 325 (65 FE) MG tablet One PO three times a week. 90 tablet 1    Multiple Vitamins-Minerals (HAIR/SKIN/NAILS PO) Take 2 tablets by mouth daily      Ascorbic Acid (VITAMIN C) 500 MG CAPS Take 1 tablet by mouth daily.  Cyanocobalamin (B-12) 1000 MCG TBCR Take 1 tablet by mouth daily. No current facility-administered medications on file prior to visit. Review of Systems   Constitutional: Negative for fever. HENT: Negative for hearing loss. Respiratory: Negative for shortness of breath. Gastrointestinal: Negative for constipation, diarrhea and nausea. Genitourinary: Negative for difficulty urinating. Musculoskeletal: Positive for back pain. Negative for neck pain. Skin: Negative for rash. Neurological: Positive for headaches. Hematological: Bruises/bleeds easily. Psychiatric/Behavioral: Negative for sleep disturbance. Objective:     Vitals:  BP (!) 143/84 (Site: Left Upper Arm)   Temp 97.6 °F (36.4 °C) (Temporal)   Ht 5' 4\" (1.626 m)   Wt (!) 320 lb (145.2 kg)   LMP 12/27/2016   BMI 54.93 kg/m²        Physical Exam      Assessment:      Diagnosis Orders   1. Lumbar spondylosis     2. Lumbar radiculopathy     3. Spondylolisthesis of lumbar region         Plan:     Mrs. Ameya Anderson returns my office continued follow-up. Has seen me previously with morbid obesity with spondylolisthesis grade 2. No new changes at this point overall feeling better with the Neurontin. Has undergone limited lumbar therapy due to cost issues. No bowel bladder incontinence. Able to get up and ambulate. Clinically shows some Deeks range of motion but otherwise stable motor and sensory findings ambulating well for strength and tones. X-rays from 2020 was reviewed by me and compared to that from 2012 shows overall stable grade 2 spondylolisthesis. Impression:    Morbid obesity with grade 2 spondylolisthesis stable from 2012.   Discussed the patient at this point of

## 2021-06-14 DIAGNOSIS — M47.816 LUMBAR SPONDYLOSIS: ICD-10-CM

## 2021-06-14 DIAGNOSIS — M54.16 LUMBAR RADICULOPATHY: ICD-10-CM

## 2021-06-14 DIAGNOSIS — M43.16 SPONDYLOLISTHESIS OF LUMBAR REGION: ICD-10-CM

## 2021-06-14 RX ORDER — TIZANIDINE 4 MG/1
TABLET ORAL
Qty: 60 TABLET | Refills: 0 | Status: CANCELLED | OUTPATIENT
Start: 2021-06-14

## 2021-06-14 RX ORDER — SUMATRIPTAN 50 MG/1
TABLET, FILM COATED ORAL
Qty: 9 TABLET | Refills: 5 | Status: CANCELLED | OUTPATIENT
Start: 2021-06-14

## 2021-06-14 NOTE — TELEPHONE ENCOUNTER
Patient requesting medication refill. Please approve or deny this request.    Rx requested:  Requested Prescriptions     Pending Prescriptions Disp Refills    tiZANidine (ZANAFLEX) 4 MG tablet 60 tablet 0    SUMAtriptan (IMITREX) 50 MG tablet 9 tablet 5         Last Office Visit:   6/18/2020      Next Visit Date:  No future appointments.

## 2021-06-15 ENCOUNTER — OFFICE VISIT (OUTPATIENT)
Dept: FAMILY MEDICINE CLINIC | Age: 56
End: 2021-06-15
Payer: COMMERCIAL

## 2021-06-15 VITALS
WEIGHT: 293 LBS | SYSTOLIC BLOOD PRESSURE: 110 MMHG | HEART RATE: 74 BPM | OXYGEN SATURATION: 97 % | HEIGHT: 64 IN | RESPIRATION RATE: 16 BRPM | DIASTOLIC BLOOD PRESSURE: 80 MMHG | TEMPERATURE: 98.7 F | BODY MASS INDEX: 50.02 KG/M2

## 2021-06-15 DIAGNOSIS — Z12.31 VISIT FOR SCREENING MAMMOGRAM: ICD-10-CM

## 2021-06-15 DIAGNOSIS — M81.8 OTHER OSTEOPOROSIS WITHOUT CURRENT PATHOLOGICAL FRACTURE: ICD-10-CM

## 2021-06-15 DIAGNOSIS — M25.571 BILATERAL ANKLE PAIN, UNSPECIFIED CHRONICITY: Primary | ICD-10-CM

## 2021-06-15 DIAGNOSIS — M25.572 BILATERAL ANKLE PAIN, UNSPECIFIED CHRONICITY: Primary | ICD-10-CM

## 2021-06-15 PROCEDURE — 99214 OFFICE O/P EST MOD 30 MIN: CPT | Performed by: FAMILY MEDICINE

## 2021-06-15 RX ORDER — AZELASTINE 1 MG/ML
1 SPRAY, METERED NASAL DAILY
COMMUNITY
Start: 2020-07-08

## 2021-06-15 RX ORDER — CETIRIZINE HYDROCHLORIDE 10 MG/1
1 TABLET ORAL DAILY
COMMUNITY

## 2021-06-15 RX ORDER — CELECOXIB 200 MG/1
200 CAPSULE ORAL DAILY PRN
Qty: 90 CAPSULE | Refills: 0 | Status: SHIPPED | OUTPATIENT
Start: 2021-06-15

## 2021-06-15 RX ORDER — TIZANIDINE 4 MG/1
TABLET ORAL
Qty: 60 TABLET | Refills: 0 | Status: SHIPPED | OUTPATIENT
Start: 2021-06-15

## 2021-06-15 SDOH — ECONOMIC STABILITY: FOOD INSECURITY: WITHIN THE PAST 12 MONTHS, THE FOOD YOU BOUGHT JUST DIDN'T LAST AND YOU DIDN'T HAVE MONEY TO GET MORE.: NEVER TRUE

## 2021-06-15 SDOH — ECONOMIC STABILITY: TRANSPORTATION INSECURITY
IN THE PAST 12 MONTHS, HAS LACK OF TRANSPORTATION KEPT YOU FROM MEETINGS, WORK, OR FROM GETTING THINGS NEEDED FOR DAILY LIVING?: NO

## 2021-06-15 SDOH — ECONOMIC STABILITY: FOOD INSECURITY: WITHIN THE PAST 12 MONTHS, YOU WORRIED THAT YOUR FOOD WOULD RUN OUT BEFORE YOU GOT MONEY TO BUY MORE.: NEVER TRUE

## 2021-06-15 SDOH — ECONOMIC STABILITY: TRANSPORTATION INSECURITY
IN THE PAST 12 MONTHS, HAS THE LACK OF TRANSPORTATION KEPT YOU FROM MEDICAL APPOINTMENTS OR FROM GETTING MEDICATIONS?: NO

## 2021-06-15 ASSESSMENT — PATIENT HEALTH QUESTIONNAIRE - PHQ9
1. LITTLE INTEREST OR PLEASURE IN DOING THINGS: 0
SUM OF ALL RESPONSES TO PHQ QUESTIONS 1-9: 0
SUM OF ALL RESPONSES TO PHQ9 QUESTIONS 1 & 2: 0
SUM OF ALL RESPONSES TO PHQ QUESTIONS 1-9: 0
2. FEELING DOWN, DEPRESSED OR HOPELESS: 0
SUM OF ALL RESPONSES TO PHQ QUESTIONS 1-9: 0

## 2021-06-15 ASSESSMENT — SOCIAL DETERMINANTS OF HEALTH (SDOH): HOW HARD IS IT FOR YOU TO PAY FOR THE VERY BASICS LIKE FOOD, HOUSING, MEDICAL CARE, AND HEATING?: NOT HARD AT ALL

## 2021-06-15 NOTE — PROGRESS NOTES
and importance of compliance with the treatment plan. Plan:  Current Outpatient Medications   Medication Sig Dispense Refill    VITAMIN D PO Take 1,000 Units by mouth daily      azelastine (ASTELIN) 0.1 % nasal spray 1 spray by Nasal route daily      cetirizine (ZYRTEC ALLERGY) 10 MG tablet Take 1 tablet by mouth daily      tiZANidine (ZANAFLEX) 4 MG tablet TAKE ONE TABLET BY MOUTH ONCE PER DAY 60 tablet 0    celecoxib (CELEBREX) 200 MG capsule Take 1 capsule by mouth daily as needed for Pain 90 capsule 0    gabapentin (NEURONTIN) 100 MG capsule TAKE ONE CAPSULE BY MOUTH THREE TIMES A  capsule 0    DULoxetine (CYMBALTA) 30 MG extended release capsule TAKE ONE CAPSULE BY MOUTH DAILY 90 capsule 3    SUMAtriptan (IMITREX) 50 MG tablet take 1 tablet by mouth once as needed for migraine 9 tablet 5    ferrous sulfate 325 (65 FE) MG tablet One PO three times a week. 90 tablet 1    Ascorbic Acid (VITAMIN C) 500 MG CAPS Take 1 tablet by mouth daily.  Cyanocobalamin (B-12) 1000 MCG TBCR Take 1 tablet by mouth daily. No current facility-administered medications for this visit. Orders Placed This Encounter   Procedures    EBONY DIGITAL SCREEN W OR WO CAD BILATERAL     Standing Status:   Future     Standing Expiration Date:   8/15/2022     Scheduling Instructions:      US if needed     Order Specific Question:   Reason for exam:     Answer:   routine       Orders Placed This Encounter   Medications    tiZANidine (ZANAFLEX) 4 MG tablet     Sig: TAKE ONE TABLET BY MOUTH ONCE PER DAY     Dispense:  60 tablet     Refill:  0    celecoxib (CELEBREX) 200 MG capsule     Sig: Take 1 capsule by mouth daily as needed for Pain     Dispense:  90 capsule     Refill:  0              Return in about 6 months (around 12/15/2021), or pap fu in [place] or [place]. An electronic signature was used to authenticate this note.   Dr. Mary Lou Adkins MD      6/15/21  10:07 AM

## 2021-07-13 ENCOUNTER — HOSPITAL ENCOUNTER (OUTPATIENT)
Dept: WOMENS IMAGING | Age: 56
Discharge: HOME | End: 2021-07-15
Payer: COMMERCIAL

## 2021-07-13 VITALS — BODY MASS INDEX: 54.07 KG/M2 | HEIGHT: 64 IN

## 2021-07-13 DIAGNOSIS — Z12.31 VISIT FOR SCREENING MAMMOGRAM: ICD-10-CM

## 2021-07-13 PROCEDURE — 77063 BREAST TOMOSYNTHESIS BI: CPT

## 2022-05-25 ENCOUNTER — HOSPITAL ENCOUNTER (OUTPATIENT)
Dept: WOMENS IMAGING | Age: 57
Discharge: HOME | End: 2022-05-27
Payer: COMMERCIAL

## 2022-05-25 DIAGNOSIS — M81.0 SENILE OSTEOPOROSIS: ICD-10-CM

## 2022-05-25 PROCEDURE — 77080 DXA BONE DENSITY AXIAL: CPT

## 2023-01-05 ENCOUNTER — HOSPITAL ENCOUNTER (OUTPATIENT)
Dept: WOMENS IMAGING | Age: 58
Discharge: HOME | End: 2023-01-07
Payer: COMMERCIAL

## 2023-01-05 DIAGNOSIS — Z12.31 ENCOUNTER FOR SCREENING MAMMOGRAM FOR MALIGNANT NEOPLASM OF BREAST: ICD-10-CM

## 2023-01-05 PROCEDURE — 77063 BREAST TOMOSYNTHESIS BI: CPT

## 2023-01-11 ENCOUNTER — HOSPITAL ENCOUNTER (OUTPATIENT)
Dept: ULTRASOUND IMAGING | Age: 58
End: 2023-01-11
Payer: COMMERCIAL

## 2023-01-11 ENCOUNTER — HOSPITAL ENCOUNTER (OUTPATIENT)
Dept: WOMENS IMAGING | Age: 58
Discharge: HOME | End: 2023-01-13
Payer: COMMERCIAL

## 2023-01-11 DIAGNOSIS — R92.8 ABNORMAL MAMMOGRAM: ICD-10-CM

## 2023-01-11 PROCEDURE — 77065 DX MAMMO INCL CAD UNI: CPT

## 2023-10-09 ENCOUNTER — APPOINTMENT (OUTPATIENT)
Dept: ORTHOPEDIC SURGERY | Facility: CLINIC | Age: 58
End: 2023-10-09
Payer: COMMERCIAL

## 2024-04-20 ENCOUNTER — HOSPITAL ENCOUNTER (OUTPATIENT)
Dept: RADIOLOGY | Facility: HOSPITAL | Age: 59
Discharge: HOME | End: 2024-04-20
Payer: COMMERCIAL

## 2024-04-20 DIAGNOSIS — R05.9 COUGH, UNSPECIFIED: ICD-10-CM

## 2024-04-20 PROCEDURE — 71046 X-RAY EXAM CHEST 2 VIEWS: CPT | Performed by: RADIOLOGY

## 2024-04-20 PROCEDURE — 71046 X-RAY EXAM CHEST 2 VIEWS: CPT

## 2025-02-06 ENCOUNTER — OFFICE VISIT (OUTPATIENT)
Dept: OBGYN CLINIC | Age: 60
End: 2025-02-06
Payer: COMMERCIAL

## 2025-02-06 VITALS
WEIGHT: 293 LBS | HEART RATE: 78 BPM | SYSTOLIC BLOOD PRESSURE: 132 MMHG | DIASTOLIC BLOOD PRESSURE: 80 MMHG | HEIGHT: 64 IN | BODY MASS INDEX: 50.02 KG/M2

## 2025-02-06 DIAGNOSIS — Z01.419 WOMEN'S ANNUAL ROUTINE GYNECOLOGICAL EXAMINATION: Primary | ICD-10-CM

## 2025-02-06 DIAGNOSIS — R35.0 URINARY FREQUENCY: ICD-10-CM

## 2025-02-06 DIAGNOSIS — Z11.51 SCREENING FOR HUMAN PAPILLOMAVIRUS: ICD-10-CM

## 2025-02-06 DIAGNOSIS — Z12.31 ENCOUNTER FOR SCREENING MAMMOGRAM FOR BREAST CANCER: ICD-10-CM

## 2025-02-06 DIAGNOSIS — Z01.419 WOMEN'S ANNUAL ROUTINE GYNECOLOGICAL EXAMINATION: ICD-10-CM

## 2025-02-06 DIAGNOSIS — R39.15 URINARY URGENCY: ICD-10-CM

## 2025-02-06 LAB
BILIRUB UR QL STRIP: NEGATIVE
CLARITY UR: CLEAR
COLOR UR: ABNORMAL
GLUCOSE UR STRIP-MCNC: NEGATIVE MG/DL
HGB UR QL STRIP: NEGATIVE
KETONES UR STRIP-MCNC: NEGATIVE MG/DL
LEUKOCYTE ESTERASE UR QL STRIP: NEGATIVE
NITRITE UR QL STRIP: NEGATIVE
PH UR STRIP: 6 [PH] (ref 5–9)
PROT UR STRIP-MCNC: NEGATIVE MG/DL
SP GR UR STRIP: 1.01 (ref 1–1.03)
UROBILINOGEN UR STRIP-ACNC: 0.2 E.U./DL

## 2025-02-06 PROCEDURE — 99386 PREV VISIT NEW AGE 40-64: CPT | Performed by: ADVANCED PRACTICE MIDWIFE

## 2025-02-06 PROCEDURE — 99204 OFFICE O/P NEW MOD 45 MIN: CPT | Performed by: ADVANCED PRACTICE MIDWIFE

## 2025-02-06 RX ORDER — AMLODIPINE BESYLATE 5 MG/1
5 TABLET ORAL DAILY
COMMUNITY

## 2025-02-06 RX ORDER — DULOXETIN HYDROCHLORIDE 60 MG/1
60 CAPSULE, DELAYED RELEASE ORAL DAILY
COMMUNITY
Start: 2025-01-10

## 2025-02-06 RX ORDER — ERGOCALCIFEROL 1.25 MG/1
50000 CAPSULE, LIQUID FILLED ORAL WEEKLY
COMMUNITY

## 2025-02-06 RX ORDER — ROSUVASTATIN CALCIUM 10 MG/1
10 TABLET, COATED ORAL DAILY
COMMUNITY

## 2025-02-06 RX ORDER — FUROSEMIDE 20 MG/1
20 TABLET ORAL DAILY
COMMUNITY

## 2025-02-06 RX ORDER — GABAPENTIN 300 MG/1
300 CAPSULE ORAL NIGHTLY
COMMUNITY
Start: 2025-01-08

## 2025-02-06 RX ORDER — CYANOCOBALAMIN 1000 UG/ML
100 INJECTION, SOLUTION INTRAMUSCULAR; SUBCUTANEOUS ONCE
COMMUNITY
Start: 2025-01-21

## 2025-02-06 RX ORDER — CALCIUM CARBONATE 500(1250)
600 TABLET ORAL DAILY
COMMUNITY

## 2025-02-06 RX ORDER — OXYBUTYNIN CHLORIDE 10 MG/1
10 TABLET, EXTENDED RELEASE ORAL DAILY
Qty: 30 TABLET | Refills: 1 | Status: SHIPPED | OUTPATIENT
Start: 2025-02-06 | End: 2025-04-07

## 2025-02-06 ASSESSMENT — ENCOUNTER SYMPTOMS
TROUBLE SWALLOWING: 0
ABDOMINAL PAIN: 0
VOMITING: 0
RHINORRHEA: 0
COUGH: 0
VOICE CHANGE: 0
SHORTNESS OF BREATH: 0
NAUSEA: 0
SORE THROAT: 0
CONSTIPATION: 0
DIARRHEA: 0

## 2025-02-06 NOTE — PROGRESS NOTES
Mental status is at baseline.      Motor: No weakness.      Coordination: Coordination normal.      Gait: Gait normal.   Psychiatric:         Mood and Affect: Mood normal.         Behavior: Behavior normal.       Assessment:      Diagnosis Orders   1. Women's annual routine gynecological examination  PAP SMEAR      2. Screening for human papillomavirus  PAP SMEAR      3. Urinary frequency  Culture, Urine    Urinalysis    oxyBUTYnin (DITROPAN XL) 10 MG extended release tablet      4. Urinary urgency  Culture, Urine    Urinalysis    oxyBUTYnin (DITROPAN XL) 10 MG extended release tablet      5. Encounter for screening mammogram for breast cancer  EBONY DIGITAL SCREEN W OR WO CAD BILATERAL        Plan:     Annual Exam  Pap - Collected    Screening for STD's  Declined    Urinary Frequency, Urgency  Urine collected for UA with Culture  Rx for Ditropan XL  RTC in 3-4 weeks for follow-up    Screening for Breast Cancer  Mammogram referral given    Follow Up:  Return in about 3 weeks (around 2/27/2025) for Urinary frequency, urgency.    Orders Placed This Encounter   Procedures    Culture, Urine     Standing Status:   Future     Standing Expiration Date:   2/6/2026     Order Specific Question:   Specify (ex-cath, midstream, cysto, etc)?     Answer:   midstream    EBONY DIGITAL SCREEN W OR WO CAD BILATERAL     Standing Status:   Future     Standing Expiration Date:   2/5/2026     Order Specific Question:   Reason for exam:     Answer:   Screening for breast cancer    PAP SMEAR     Standing Status:   Future     Standing Expiration Date:   2/5/2026     Order Specific Question:   Collection Type     Answer:   Thin Prep     Order Specific Question:   Prior Abnormal Pap Test     Answer:   No     Order Specific Question:   Screening or Diagnostic     Answer:   Screening     Order Specific Question:   HPV Requested?     Answer:   Yes     Order Specific Question:   High Risk Patient     Answer:   N/A    Urinalysis     Standing Status:

## 2025-02-07 LAB — BACTERIA UR CULT: NORMAL

## 2025-02-11 LAB
HPV HR 12 DNA SPEC QL NAA+PROBE: NOT DETECTED
HPV16 DNA SPEC QL NAA+PROBE: NOT DETECTED
HPV16+18+H RISK 12 DNA SPEC-IMP: NORMAL
HPV18 DNA SPEC QL NAA+PROBE: NOT DETECTED

## 2025-02-27 ENCOUNTER — OFFICE VISIT (OUTPATIENT)
Dept: OBGYN CLINIC | Age: 60
End: 2025-02-27
Payer: COMMERCIAL

## 2025-02-27 ENCOUNTER — HOSPITAL ENCOUNTER (OUTPATIENT)
Dept: WOMENS IMAGING | Age: 60
Discharge: HOME | End: 2025-03-01
Attending: ADVANCED PRACTICE MIDWIFE
Payer: COMMERCIAL

## 2025-02-27 VITALS
WEIGHT: 293 LBS | HEART RATE: 67 BPM | DIASTOLIC BLOOD PRESSURE: 80 MMHG | BODY MASS INDEX: 53.9 KG/M2 | SYSTOLIC BLOOD PRESSURE: 124 MMHG

## 2025-02-27 DIAGNOSIS — R39.15 URINARY URGENCY: ICD-10-CM

## 2025-02-27 DIAGNOSIS — R35.0 URINARY FREQUENCY: Primary | ICD-10-CM

## 2025-02-27 DIAGNOSIS — Z12.31 ENCOUNTER FOR SCREENING MAMMOGRAM FOR BREAST CANCER: ICD-10-CM

## 2025-02-27 PROCEDURE — 99214 OFFICE O/P EST MOD 30 MIN: CPT | Performed by: ADVANCED PRACTICE MIDWIFE

## 2025-02-27 PROCEDURE — 77063 BREAST TOMOSYNTHESIS BI: CPT

## 2025-02-27 RX ORDER — SOLIFENACIN SUCCINATE 10 MG/1
10 TABLET, FILM COATED ORAL DAILY
Qty: 30 TABLET | Refills: 2 | Status: SHIPPED | OUTPATIENT
Start: 2025-02-27 | End: 2025-05-28

## 2025-02-27 ASSESSMENT — ENCOUNTER SYMPTOMS
ABDOMINAL PAIN: 0
SHORTNESS OF BREATH: 0
NAUSEA: 0
CONSTIPATION: 0
COUGH: 0
VOMITING: 0
DIARRHEA: 0

## 2025-02-27 NOTE — PROGRESS NOTES
SUBJECTIVE:  Stephanie Bishop is a 59 y.o. female who presents here today for complaints of:      Chief Complaint   Patient presents with    Medication Check     Follow up on ditropan for urinary frequency and urgency , she takes it at night, but she has had some improvement at night      Urinary Frequency, Urgency  Started Ditropan about 3 weeks ago and has had relief of urgency and frequency symptoms.  She also has had complete relief of incontinence symptoms.  She is bothered by the drying side effect and feels that the medicine seems to start wearing off before it is time for the next dose.    Review of Systems   Respiratory:  Negative for cough and shortness of breath.    Gastrointestinal:  Negative for abdominal pain, constipation, diarrhea, nausea and vomiting.   Genitourinary:  Negative for difficulty urinating, dysuria, menstrual problem, pelvic pain, vaginal bleeding and vaginal discharge.   All other systems reviewed and are negative.    OBJECTIVE:  Vitals:  /80   Pulse 67   Wt (!) 142.4 kg (314 lb)   LMP 12/27/2016   BMI 53.90 kg/m²     Physical Exam  Appearance:  Normal appearance  Cardiovascular:  Normal rate, Capillary refill less than 2 seconds  Pulmonary:  Normal effort, no distress  Abdominal:  No tenderness  MS:  No Swelling, No dependent edema  Skin:  Warm, dry  Neuro:  Alert and oriented x3, reflexes normal.  Psychiatric:  Normal mood and behavior    ASSESSMENT & PLAN:   Diagnosis Orders   1. Urinary frequency  solifenacin (VESICARE) 10 MG tablet      2. Urinary urgency  solifenacin (VESICARE) 10 MG tablet          Urinary Urgency, Frequency  Stop Ditropan  Start Vesicare 10mg/day    Return if symptoms worsen or fail to improve.    Genesis Callahan, NICKY - SAUL

## 2025-04-26 DIAGNOSIS — R35.0 URINARY FREQUENCY: ICD-10-CM

## 2025-04-26 DIAGNOSIS — R39.15 URINARY URGENCY: ICD-10-CM

## 2025-04-30 RX ORDER — SOLIFENACIN SUCCINATE 10 MG/1
10 TABLET, FILM COATED ORAL DAILY
Qty: 90 TABLET | Refills: 3 | Status: SHIPPED | OUTPATIENT
Start: 2025-04-30 | End: 2026-04-30

## 2025-04-30 RX ORDER — OXYBUTYNIN CHLORIDE 10 MG/1
10 TABLET, EXTENDED RELEASE ORAL DAILY
Qty: 30 TABLET | Refills: 1 | OUTPATIENT
Start: 2025-04-30 | End: 2025-06-29

## 2025-04-30 NOTE — TELEPHONE ENCOUNTER
She switched from Oxybutynin (Ditropan) to Solifenacin (Vesiare) due to side effects.  Can you clarify which medication she wants a refill for?  You wouldn't take both, just one or the other.

## 2025-05-25 DIAGNOSIS — R35.0 URINARY FREQUENCY: ICD-10-CM

## 2025-05-25 DIAGNOSIS — R39.15 URINARY URGENCY: ICD-10-CM

## 2025-05-27 RX ORDER — SOLIFENACIN SUCCINATE 10 MG/1
10 TABLET, FILM COATED ORAL DAILY
Qty: 90 TABLET | Refills: 3 | Status: SHIPPED | OUTPATIENT
Start: 2025-05-27 | End: 2026-05-27

## (undated) DEVICE — BLUNT CANNULA: Brand: MONOJECT

## (undated) DEVICE — ENDO CARRY-ON PROCEDURE KIT: Brand: ENDO CARRY-ON PROCEDURE KIT

## (undated) DEVICE — SONY PRINTER PAPER

## (undated) DEVICE — BRUSH CLEANING ENDOSCOPE CHAN DISP

## (undated) DEVICE — TUBE SET 96 MM 64 MM H2O PERISTALTIC STD AUX CHANNEL

## (undated) DEVICE — JELLY LUBRICATING 5 GM STRL SURGLUB

## (undated) DEVICE — ADAPTER FLSH PMP FLD MGMT GI IRRIG OFP 2 DISPOSABLE

## (undated) DEVICE — STERILE LATEX POWDER-FREE SURGICAL GLOVESWITH NITRILE COATING: Brand: PROTEXIS

## (undated) DEVICE — 1200CC COLLECTION UNIT 6MM X 6' PATIENT: Brand: MEDI-VAC

## (undated) DEVICE — Device: Brand: ENDO SMARTCAP